# Patient Record
Sex: FEMALE | Race: WHITE | NOT HISPANIC OR LATINO | Employment: FULL TIME | URBAN - METROPOLITAN AREA
[De-identification: names, ages, dates, MRNs, and addresses within clinical notes are randomized per-mention and may not be internally consistent; named-entity substitution may affect disease eponyms.]

---

## 2013-02-01 LAB — CRC RECOMMENDATION EXT: NORMAL

## 2019-05-22 ENCOUNTER — HISTORICAL (OUTPATIENT)
Dept: ADMINISTRATIVE | Facility: HOSPITAL | Age: 59
End: 2019-05-22

## 2019-05-22 LAB
ABS NEUT (OLG): 2.64 X10(3)/MCL (ref 2.1–9.2)
ALBUMIN SERPL-MCNC: 4 GM/DL (ref 3.4–5)
ALBUMIN/GLOB SERPL: 1.5 {RATIO}
ALP SERPL-CCNC: 70 UNIT/L (ref 38–126)
ALT SERPL-CCNC: 33 UNIT/L (ref 12–78)
AST SERPL-CCNC: 16 UNIT/L (ref 15–37)
BASOPHILS # BLD AUTO: 0.1 X10(3)/MCL (ref 0–0.2)
BASOPHILS NFR BLD AUTO: 1 %
BILIRUB SERPL-MCNC: 0.7 MG/DL (ref 0.2–1)
BILIRUBIN DIRECT+TOT PNL SERPL-MCNC: 0.2 MG/DL (ref 0–0.2)
BILIRUBIN DIRECT+TOT PNL SERPL-MCNC: 0.5 MG/DL (ref 0–0.8)
BUN SERPL-MCNC: 18 MG/DL (ref 7–18)
CALCIUM SERPL-MCNC: 8.8 MG/DL (ref 8.5–10.1)
CHLORIDE SERPL-SCNC: 106 MMOL/L (ref 98–107)
CHOLEST SERPL-MCNC: 130 MG/DL (ref 0–200)
CHOLEST/HDLC SERPL: 2.5 {RATIO} (ref 0–4)
CO2 SERPL-SCNC: 30 MMOL/L (ref 21–32)
CREAT SERPL-MCNC: 0.72 MG/DL (ref 0.55–1.02)
EOSINOPHIL # BLD AUTO: 0.3 X10(3)/MCL (ref 0–0.9)
EOSINOPHIL NFR BLD AUTO: 5 %
ERYTHROCYTE [DISTWIDTH] IN BLOOD BY AUTOMATED COUNT: 12.9 % (ref 11.5–17)
GLOBULIN SER-MCNC: 2.6 GM/DL (ref 2.4–3.5)
GLUCOSE SERPL-MCNC: 105 MG/DL (ref 74–106)
HCT VFR BLD AUTO: 43.6 % (ref 37–47)
HDLC SERPL-MCNC: 53 MG/DL (ref 35–60)
HGB BLD-MCNC: 14 GM/DL (ref 12–16)
LDLC SERPL CALC-MCNC: 66 MG/DL (ref 0–129)
LYMPHOCYTES # BLD AUTO: 1.5 X10(3)/MCL (ref 0.6–4.6)
LYMPHOCYTES NFR BLD AUTO: 30 %
MCH RBC QN AUTO: 31.5 PG (ref 27–31)
MCHC RBC AUTO-ENTMCNC: 32.1 GM/DL (ref 33–36)
MCV RBC AUTO: 98.2 FL (ref 80–94)
MONOCYTES # BLD AUTO: 0.6 X10(3)/MCL (ref 0.1–1.3)
MONOCYTES NFR BLD AUTO: 12 %
NEUTROPHILS # BLD AUTO: 2.64 X10(3)/MCL (ref 2.1–9.2)
NEUTROPHILS NFR BLD AUTO: 52 %
PLATELET # BLD AUTO: 166 X10(3)/MCL (ref 130–400)
PMV BLD AUTO: 13 FL (ref 9.4–12.4)
POTASSIUM SERPL-SCNC: 4.2 MMOL/L (ref 3.5–5.1)
PROT SERPL-MCNC: 6.6 GM/DL (ref 6.4–8.2)
RBC # BLD AUTO: 4.44 X10(6)/MCL (ref 4.2–5.4)
SODIUM SERPL-SCNC: 141 MMOL/L (ref 136–145)
TRIGL SERPL-MCNC: 54 MG/DL (ref 30–150)
TSH SERPL-ACNC: 2.1 MIU/L (ref 0.36–3.74)
VLDLC SERPL CALC-MCNC: 11 MG/DL
WBC # SPEC AUTO: 5 X10(3)/MCL (ref 4.5–11.5)

## 2019-07-08 ENCOUNTER — HISTORICAL (OUTPATIENT)
Dept: ADMINISTRATIVE | Facility: HOSPITAL | Age: 59
End: 2019-07-08

## 2019-07-29 ENCOUNTER — HISTORICAL (OUTPATIENT)
Dept: RADIOLOGY | Facility: HOSPITAL | Age: 59
End: 2019-07-29

## 2019-09-12 LAB — HUMAN PAPILLOMAVIRUS (HPV): NORMAL

## 2020-05-12 ENCOUNTER — HISTORICAL (OUTPATIENT)
Dept: LAB | Facility: HOSPITAL | Age: 60
End: 2020-05-12

## 2020-05-28 ENCOUNTER — HISTORICAL (OUTPATIENT)
Dept: ADMINISTRATIVE | Facility: HOSPITAL | Age: 60
End: 2020-05-28

## 2020-05-28 LAB
ABS NEUT (OLG): 4.36 X10(3)/MCL (ref 2.1–9.2)
ALBUMIN SERPL-MCNC: 4.1 GM/DL (ref 3.4–4.8)
ALBUMIN/GLOB SERPL: 1.6 RATIO (ref 1.1–2)
ALP SERPL-CCNC: 75 UNIT/L (ref 40–150)
ALT SERPL-CCNC: 24 UNIT/L (ref 0–55)
AST SERPL-CCNC: 18 UNIT/L (ref 5–34)
BASOPHILS # BLD AUTO: 0.1 X10(3)/MCL (ref 0–0.2)
BASOPHILS NFR BLD AUTO: 1 %
BILIRUB SERPL-MCNC: 0.6 MG/DL
BILIRUBIN DIRECT+TOT PNL SERPL-MCNC: 0.2 MG/DL (ref 0–0.5)
BILIRUBIN DIRECT+TOT PNL SERPL-MCNC: 0.4 MG/DL (ref 0–0.8)
BUN SERPL-MCNC: 19.3 MG/DL (ref 9.8–20.1)
CALCIUM SERPL-MCNC: 8.9 MG/DL (ref 8.4–10.2)
CHLORIDE SERPL-SCNC: 104 MMOL/L (ref 98–107)
CHOLEST SERPL-MCNC: 155 MG/DL
CHOLEST/HDLC SERPL: 3 {RATIO} (ref 0–5)
CO2 SERPL-SCNC: 27 MMOL/L (ref 23–31)
CREAT SERPL-MCNC: 0.72 MG/DL (ref 0.55–1.02)
EOSINOPHIL # BLD AUTO: 0.2 X10(3)/MCL (ref 0–0.9)
EOSINOPHIL NFR BLD AUTO: 4 %
ERYTHROCYTE [DISTWIDTH] IN BLOOD BY AUTOMATED COUNT: 12.9 % (ref 11.5–17)
GLOBULIN SER-MCNC: 2.5 GM/DL (ref 2.4–3.5)
GLUCOSE SERPL-MCNC: 112 MG/DL (ref 82–115)
HCT VFR BLD AUTO: 44.7 % (ref 37–47)
HDLC SERPL-MCNC: 47 MG/DL (ref 35–60)
HGB BLD-MCNC: 14.2 GM/DL (ref 12–16)
LDLC SERPL CALC-MCNC: 98 MG/DL (ref 50–140)
LYMPHOCYTES # BLD AUTO: 1.2 X10(3)/MCL (ref 0.6–4.6)
LYMPHOCYTES NFR BLD AUTO: 19 %
MCH RBC QN AUTO: 31.8 PG (ref 27–31)
MCHC RBC AUTO-ENTMCNC: 31.8 GM/DL (ref 33–36)
MCV RBC AUTO: 100 FL (ref 80–94)
MONOCYTES # BLD AUTO: 0.7 X10(3)/MCL (ref 0.1–1.3)
MONOCYTES NFR BLD AUTO: 10 %
NEUTROPHILS # BLD AUTO: 4.36 X10(3)/MCL (ref 2.1–9.2)
NEUTROPHILS NFR BLD AUTO: 66 %
PLATELET # BLD AUTO: 171 X10(3)/MCL (ref 130–400)
PMV BLD AUTO: 12.7 FL (ref 9.4–12.4)
POTASSIUM SERPL-SCNC: 4.7 MMOL/L (ref 3.5–5.1)
PROT SERPL-MCNC: 6.6 GM/DL (ref 5.8–7.6)
RBC # BLD AUTO: 4.47 X10(6)/MCL (ref 4.2–5.4)
SODIUM SERPL-SCNC: 139 MMOL/L (ref 136–145)
TRIGL SERPL-MCNC: 50 MG/DL (ref 37–140)
TSH SERPL-ACNC: 1.22 UIU/ML (ref 0.35–4.94)
VLDLC SERPL CALC-MCNC: 10 MG/DL
WBC # SPEC AUTO: 6.6 X10(3)/MCL (ref 4.5–11.5)

## 2020-06-02 ENCOUNTER — HISTORICAL (OUTPATIENT)
Dept: ADMINISTRATIVE | Facility: HOSPITAL | Age: 60
End: 2020-06-02

## 2020-06-02 LAB — VIT B12 SERPL-MCNC: 288 PG/ML (ref 213–816)

## 2020-09-03 ENCOUNTER — HISTORICAL (OUTPATIENT)
Dept: RADIOLOGY | Facility: HOSPITAL | Age: 60
End: 2020-09-03

## 2020-09-24 ENCOUNTER — HISTORICAL (OUTPATIENT)
Dept: RADIOLOGY | Facility: HOSPITAL | Age: 60
End: 2020-09-24

## 2021-06-01 ENCOUNTER — HISTORICAL (OUTPATIENT)
Dept: ADMINISTRATIVE | Facility: HOSPITAL | Age: 61
End: 2021-06-01

## 2021-06-01 LAB
ABS NEUT (OLG): 2.52 X10(3)/MCL (ref 2.1–9.2)
ALBUMIN SERPL-MCNC: 3.9 GM/DL (ref 3.4–4.8)
ALBUMIN/GLOB SERPL: 1.6 RATIO (ref 1.1–2)
ALP SERPL-CCNC: 73 UNIT/L (ref 40–150)
ALT SERPL-CCNC: 19 UNIT/L (ref 0–55)
AST SERPL-CCNC: 16 UNIT/L (ref 5–34)
BASOPHILS # BLD AUTO: 0.1 X10(3)/MCL (ref 0–0.2)
BASOPHILS NFR BLD AUTO: 1 %
BILIRUB SERPL-MCNC: 0.6 MG/DL
BILIRUBIN DIRECT+TOT PNL SERPL-MCNC: 0.3 MG/DL (ref 0–0.5)
BILIRUBIN DIRECT+TOT PNL SERPL-MCNC: 0.3 MG/DL (ref 0–0.8)
BUN SERPL-MCNC: 14.2 MG/DL (ref 9.8–20.1)
CALCIUM SERPL-MCNC: 9.1 MG/DL (ref 8.4–10.2)
CHLORIDE SERPL-SCNC: 107 MMOL/L (ref 98–107)
CHOLEST SERPL-MCNC: 138 MG/DL
CHOLEST/HDLC SERPL: 3 {RATIO} (ref 0–5)
CO2 SERPL-SCNC: 26 MMOL/L (ref 23–31)
CREAT SERPL-MCNC: 0.76 MG/DL (ref 0.55–1.02)
EOSINOPHIL # BLD AUTO: 0.2 X10(3)/MCL (ref 0–0.9)
EOSINOPHIL NFR BLD AUTO: 5 %
ERYTHROCYTE [DISTWIDTH] IN BLOOD BY AUTOMATED COUNT: 13 % (ref 11.5–17)
GLOBULIN SER-MCNC: 2.4 GM/DL (ref 2.4–3.5)
GLUCOSE SERPL-MCNC: 106 MG/DL (ref 82–115)
HCT VFR BLD AUTO: 46.1 % (ref 37–47)
HDLC SERPL-MCNC: 46 MG/DL (ref 35–60)
HGB BLD-MCNC: 14.7 GM/DL (ref 12–16)
LDLC SERPL CALC-MCNC: 78 MG/DL (ref 50–140)
LYMPHOCYTES # BLD AUTO: 1.4 X10(3)/MCL (ref 0.6–4.6)
LYMPHOCYTES NFR BLD AUTO: 30 %
MCH RBC QN AUTO: 31.6 PG (ref 27–31)
MCHC RBC AUTO-ENTMCNC: 31.9 GM/DL (ref 33–36)
MCV RBC AUTO: 99.1 FL (ref 80–94)
MONOCYTES # BLD AUTO: 0.5 X10(3)/MCL (ref 0.1–1.3)
MONOCYTES NFR BLD AUTO: 11 %
NEUTROPHILS # BLD AUTO: 2.52 X10(3)/MCL (ref 2.1–9.2)
NEUTROPHILS NFR BLD AUTO: 53 %
PLATELET # BLD AUTO: 168 X10(3)/MCL (ref 130–400)
PMV BLD AUTO: 13.4 FL (ref 9.4–12.4)
POTASSIUM SERPL-SCNC: 4.4 MMOL/L (ref 3.5–5.1)
PROT SERPL-MCNC: 6.3 GM/DL (ref 5.8–7.6)
RBC # BLD AUTO: 4.65 X10(6)/MCL (ref 4.2–5.4)
SODIUM SERPL-SCNC: 141 MMOL/L (ref 136–145)
TRIGL SERPL-MCNC: 69 MG/DL (ref 37–140)
TSH SERPL-ACNC: 1.65 UIU/ML (ref 0.35–4.94)
VLDLC SERPL CALC-MCNC: 14 MG/DL
WBC # SPEC AUTO: 4.7 X10(3)/MCL (ref 4.5–11.5)

## 2021-07-20 ENCOUNTER — HISTORICAL (OUTPATIENT)
Dept: RADIOLOGY | Facility: HOSPITAL | Age: 61
End: 2021-07-20

## 2021-08-02 ENCOUNTER — HISTORICAL (OUTPATIENT)
Dept: RADIOLOGY | Facility: HOSPITAL | Age: 61
End: 2021-08-02

## 2021-09-08 ENCOUNTER — HISTORICAL (OUTPATIENT)
Dept: RADIOLOGY | Facility: HOSPITAL | Age: 61
End: 2021-09-08

## 2021-09-08 LAB — BCS RECOMMENDATION EXT: NORMAL

## 2022-04-11 ENCOUNTER — HISTORICAL (OUTPATIENT)
Dept: ADMINISTRATIVE | Facility: HOSPITAL | Age: 62
End: 2022-04-11
Payer: COMMERCIAL

## 2022-04-25 VITALS
WEIGHT: 253 LBS | SYSTOLIC BLOOD PRESSURE: 130 MMHG | BODY MASS INDEX: 49.67 KG/M2 | DIASTOLIC BLOOD PRESSURE: 76 MMHG | HEIGHT: 60 IN | OXYGEN SATURATION: 95 %

## 2022-05-05 NOTE — HISTORICAL [facility]
This is a historical note converted from Zi. Formatting and pictures may have been removed.  Please reference [employer] for original formatting and attached multimedia. Chief Complaint  bilateral knee pain. ?had scopes done 5 years ago. ?The pain depends on her activity. ?She takes Celebrex daily that helps.  History of Present Illness  Right and left_ KNEE  ?  Knee joint pain, Worse with weightbearing  Slowly worsens with extended activity  Pain is increased by bending it and by twisting  complains of knee joint swelling and stiffness  Catching and grating during movement  Patient had good pain relief with Synvisc 1 injections?by Dr. Leyva in the past.? She currently wants injection in the right knee?with Synvisc. ?Her last injections were about 2 years ago per the patient.  Review of Systems  Systemic: No fever, no chills, and no recent weight change.  Head: No headache - frequent.  Eyes: No vision problems.  Otolarnygeal: No hearing loss, no earache, no epistaxis, no hoarseness, and no tooth pain. Gums normal.  Cardiovascular: No chest pain or discomfort and no palpitations.  Pulmonary: No pulmonary symptoms - difficulty sleeping, no dyspnea, and cough not worse in the morning.  Gastrointestinal: Appetite not decreased. No dysphagia and no constant eructation. No nausea, no vomiting, no abdominal pain, no hematochezia, and no loose/mushy stools - frequent. No constipation - frequent.  Genitourinary: No genitourinary symptoms - Getting up every night to urinate and no increase in urinary frequency. No urinary hesitancy. No urinary loss of control - difficulty stopping urination and no burning sensation during urination.  Musculoskeletal: No calf muscle cramps and no localized soft tissue swelling of the ankle.  Neurological: No fainting and no convulsions.  Psychological: Not feeling nervous tension, not feeling nervous from exhaustion, and no depression.  Skin: No rash. Previous history of no ulcers.  Physical  Exam  Vitals & Measurements  T:?98.3? ?F (Oral)?  HT:?152?cm? WT:?113.39?kg? BMI:?49.08?  Right knee exam:  PHYSICAL FINDINGS  Cardiovascular:  Arterial Pulses: Posterior tibialis pulses were normal right. Dorsalis pedis pulses were normal right.  Musculoskeletal System:  Thigh:  Right Thigh: Thigh showed quadriceps atrophy.  Knee:  Right Knee: Examined.  Knee:  Grade in the knee: Value  Grade effusion 1  Genu varum. Patella demonstrated crepitus. Anteromedial aspect was tender on palpation. Medial aspect was tender on palpation. Medial collateral ligament was tender on palpation. Active motion.  Right Knee:  Right Knee Motion: Value  Active flexion 120_ degrees  Active extension 0_ degrees  Pain was elicited by flexion. No erythema. No warmth. No medial instability. No lateral instability. No one plane medial (straight) instability. No one plane lateral (straight) instability. A Lachman test did not demonstrate one plane anterior instability.  Neurological:  Gait And Stance: A right-sided antalgic gait was observed.  ?  ?  TESTS  Imaging:  X-Ray Knee:  A complete knee x-ray with standing views was performed -of right knee.  AP and lateral view x-rays of the right knee with sunrise view of the patella were performed -of right knee.  ?  ?  IMPRESSIONS RADIOLOGY TEST  Cartilage space narrowing medial compartment right knee joint with tricompartmental osteophytes  ?   Left knee exam:  PHYSICAL FINDINGS  Cardiovascular:  Arterial Pulses: Posterior tibialis pulses were normal left. Dorsalis pedis pulses were normal left.  Musculoskeletal System:  Thigh:  Left Thigh: Thigh showed quadriceps atrophy.  Knee:  Left Knee: Examined.  Knee:  Grade in the knee: Value  Grade effusion 1  Genu varum. Patella demonstrated crepitus. Anteromedial aspect was tender on palpation. Medial aspect was tender on palpation. Medial collateral ligament was tender on palpation. Active motion.  Left Knee:  Left Knee Motion: Value  Active flexion  120_ degrees  Active extension _0 degrees  Pain was elicited by flexion. No erythema. No warmth. No medial instability. No lateral instability. No one plane medial (straight) instability. No one plane lateral (straight) instability. A Lachman test did not demonstrate one plane anterior instability.  Neurological:  Gait And Stance: A left-sided antalgic gait was observed.  ?  ?   TESTS  Imaging:  X-Ray Knee:  A complete knee x-ray with standing views was performed -of left knee.  AP and lateral view x-rays of the left knee with sunrise view of the patella were performed -of left knee.  ?  ?   IMPRESSIONS RADIOLOGY TEST  Cartilage space narrowing medial compartment left knee joint with tricompartmental osteophytes.  Assessment/Plan  1.?Morbid Obesity(  Probable Diagnosis  )?E66.01  ?Weight loss to achieve a BMI less than 40. ?Refer to the [facility] weight loss program.  Ordered:  Office/Outpatient Visit Level 2 New 20215 PC, Morbid Obesity(  Probable Diagnosis  )  Degenerative arthritis of knee, bilateral, [facility], 07/08/19 9:08:00 CDT  ?  2.?Degenerative arthritis of knee, bilateral?M17.0  ?Synvisc 1 injection?right knee once?certified with insurance.  Ordered:  Office/Outpatient Visit Level 2 New [id number] PC, Morbid Obesity(  Probable Diagnosis  )  [facility], [facility] 9[facility]08[facility]00 [facility]  ?  Orders:  Clinic Follow-up PRN, 07/08/19 9:08:00 CDT, Future Order, [facility]  XR Knee Left 4 or More Views, Routine, 07/08/19 8:39:00 CDT, Pain, None, Ambulatory, Rad Type, Bilateral knee pain, Not Scheduled, 07/08/19 8:39:00 CDT  XR Knee Right 4 or More Views, Routine, 07/08/19 8:39:00 CDT, Pain, None, Ambulatory, Rad Type, Bilateral knee pain, Not Scheduled, 07/08/19 8:39:00 CDT  Referrals  Clinic Follow-up PRN, 07/08/19 9:08:00 CDT, Future Order, [facility]   Problem List/Past Medical History  Ongoing  Abnormal CXR (chest x-ray)(  Confirmed   )  Asthma  Degenerative arthritis of knee, bilateral  Hyperlipidemia  Hyperlipidemia  Morbid Obesity(  Probable Diagnosis  )  Ongoing use of possibly toxic medication  Well adult  Well adult  Well adult health check  Historical  No qualifying data  Procedure/Surgical History  Mammogram - screening (07/24/2018)  Bone density scan (06/23/2017)  Mammogram - screening (06/23/2017)  Arthroscopic repair medial meniscus (05/25/2016)  Mammogram - screening (05/11/2016)  Papanicolaou smear taken (04/19/2016)  Arthroscopic knee operation (01/07/2015)  abcess on shin  tonsillectomy   Medications  alPRAzolam 0.25 mg oral tab  benazepril 10 mg oral tablet, See Instructions, 3 refills  CeleBREX 200 mg oral capsule, 200 mg= 1 cap(s), Oral, Daily  doxycycline hyclate 100 mg oral capsule, 100 mg= 1 cap(s), Oral, BID,? ?Not Taking per Prescriber: Last Dose Date/Time Unknown  escitalopram 20 mg oral tablet  pravastatin 40 mg oral tablet, 40 mg= 1 tab(s), Oral, Daily  prednisONE 5 mg oral tablet, 5 mg= 1 tab(s), Oral, BID,? ?Not Taking, Completed Rx: Last Dose Date/Time Unknown  Allergies  Bactrim?(Abdominal pain)  erythromycin?(Abdominal pain)  penicillins?(Flushing, Hives)  Tessalon Perles?(Numbness around mouth 07-JUN-2017 17:48:24<$>)  clindamycin?(Abdominal pain, Nausea)  iodine  Social History  Abuse/Neglect  No, 07/08/2019  Alcohol  Current, 09/04/2015  Employment/School  Employed, 09/04/2015  Exercise  Exercise duration: 0., 05/01/2017  Home/Environment  Lives with Spouse., 09/04/2015  Nutrition/Health  Regular, 09/04/2015  Sexual  Sexually active: Yes., 05/01/2017  Substance Use  Never, 09/04/2015  Tobacco  Never (less than 100 in lifetime), N/A, 07/08/2019  Family History  Bypass: Father.  Diabetes mellitus type 1.: Mother.  Heart attack.: Mother and Father.  Hyperlipidemia.: Brother.  Hypertension.: Brother.  Pacemaker: Mother.  Stent: Father.  Stroke: Mother.  Immunizations  Vaccine Date Status   influenza virus vaccine,  inactivated 11/14/2015 Recorded   tetanus-diphtheria toxoids 09/30/2015 Recorded   influenza virus vaccine, inactivated 09/30/2014 Recorded   Health Maintenance  Health Maintenance  ???Pending?(in the next year)  ??? ??OverDue  ??? ? ? ?Diabetes Screening due??and every?  ??? ??Due?  ??? ? ? ?Asthma Management-Spirometry due??07/08/19??Variable frequency  ??? ? ? ?Asthma Management-Lni Peak Flow due??07/08/19??Variable frequency  ??? ? ? ?Asthma Management-Written Action Plan due??07/08/19??and every 6??month(s)  ??? ? ? ?TB Skin Test due??07/08/19??Variable frequency  ??? ? ? ?Zoster Vaccine due??07/08/19??and every 100??year(s)  ??? ??Due In Future?  ??? ? ? ?Asthma Management-Asthma Education not due until??11/28/19??and every 6??month(s)  ??? ? ? ?Alcohol Misuse Screening not due until??01/01/20??and every 1??year(s)  ??? ? ? ?Obesity Screening not due until??01/01/20??and every 1??year(s)  ??? ? ? ?Depression Screening not due until??05/27/20??and every 1??year(s)  ??? ? ? ?Aspirin Therapy for CVD Prevention not due until??05/28/20??and every 1??year(s)  ??? ? ? ?Blood Pressure Screening not due until??07/01/20??and every 1??year(s)  ??? ? ? ?ADL Screening not due until??07/02/20??and every 1??year(s)  ??? ? ? ?Body Mass Index Check not due until??07/07/20??and every 1??year(s)  ???Satisfied?(in the past 1 year)  ??? ??Satisfied?  ??? ? ? ?ADL Screening on??07/02/19.??Satisfied by Hung Araya LPN  ??? ? ? ?Alcohol Misuse Screening on??05/28/19.??Satisfied by Gretel Carrillo LPN  ??? ? ? ?Aspirin Therapy for CVD Prevention on??05/28/19.??Satisfied by Gretel Carrillo LPN  ??? ? ? ?Asthma Management-Asthma Education on??05/28/19.??Satisfied by Gretel Carrillo LPN  ??? ? ? ?Blood Pressure Screening on??07/02/19.??Satisfied by Hung Araya LPN.  ??? ? ? ?Body Mass Index Check on??07/08/19.??Satisfied by Flora Alan  ??? ? ? ?Breast Cancer Screening on??07/24/18.??Satisfied by  Gretel Carrillo LPN  ??? ? ? ?Depression Screening on??05/28/19.??Satisfied by Gretel Carrillo LPN  ??? ? ? ?Diabetes Screening on??05/22/19.??Satisfied by Shantel Renee  ??? ? ? ?Lipid Screening on??05/22/19.??Satisfied by Shantel Renee  ??? ? ? ?Obesity Screening on??07/08/19.??Satisfied by Flora Alan  ?

## 2022-06-07 ENCOUNTER — LAB VISIT (OUTPATIENT)
Dept: LAB | Facility: HOSPITAL | Age: 62
End: 2022-06-07
Attending: INTERNAL MEDICINE
Payer: COMMERCIAL

## 2022-06-07 DIAGNOSIS — E78.5 HYPERLIPIDEMIA, UNSPECIFIED HYPERLIPIDEMIA TYPE: ICD-10-CM

## 2022-06-07 DIAGNOSIS — I10 ESSENTIAL HYPERTENSION, MALIGNANT: ICD-10-CM

## 2022-06-07 DIAGNOSIS — Z78.9 NORMAL TISSUE: ICD-10-CM

## 2022-06-07 DIAGNOSIS — M17.0 PRIMARY OSTEOARTHRITIS OF BOTH KNEES: ICD-10-CM

## 2022-06-07 DIAGNOSIS — J45.909 ASTHMATIC BRONCHITIS: Primary | ICD-10-CM

## 2022-06-07 LAB
ALBUMIN SERPL-MCNC: 3.9 GM/DL (ref 3.4–4.8)
ALBUMIN/GLOB SERPL: 1.4 RATIO (ref 1.1–2)
ALP SERPL-CCNC: 67 UNIT/L (ref 40–150)
ALT SERPL-CCNC: 21 UNIT/L (ref 0–55)
AST SERPL-CCNC: 19 UNIT/L (ref 5–34)
BASOPHILS # BLD AUTO: 0.06 X10(3)/MCL (ref 0–0.2)
BASOPHILS NFR BLD AUTO: 1.2 %
BILIRUBIN DIRECT+TOT PNL SERPL-MCNC: 0.7 MG/DL
BUN SERPL-MCNC: 17.5 MG/DL (ref 9.8–20.1)
CALCIUM SERPL-MCNC: 9.1 MG/DL (ref 8.4–10.2)
CHLORIDE SERPL-SCNC: 105 MMOL/L (ref 98–107)
CHOLEST SERPL-MCNC: 155 MG/DL
CHOLEST/HDLC SERPL: 3 {RATIO} (ref 0–5)
CO2 SERPL-SCNC: 28 MMOL/L (ref 23–31)
CREAT SERPL-MCNC: 0.78 MG/DL (ref 0.55–1.02)
EOSINOPHIL # BLD AUTO: 0.21 X10(3)/MCL (ref 0–0.9)
EOSINOPHIL NFR BLD AUTO: 4 %
ERYTHROCYTE [DISTWIDTH] IN BLOOD BY AUTOMATED COUNT: 12.8 % (ref 11.5–17)
GLOBULIN SER-MCNC: 2.7 GM/DL (ref 2.4–3.5)
GLUCOSE SERPL-MCNC: 95 MG/DL (ref 82–115)
HCT VFR BLD AUTO: 46.4 % (ref 37–47)
HDLC SERPL-MCNC: 49 MG/DL (ref 35–60)
HGB BLD-MCNC: 15.2 GM/DL (ref 12–16)
IMM GRANULOCYTES # BLD AUTO: 0.01 X10(3)/MCL (ref 0–0.02)
IMM GRANULOCYTES NFR BLD AUTO: 0.2 % (ref 0–0.43)
LDLC SERPL CALC-MCNC: 94 MG/DL (ref 50–140)
LYMPHOCYTES # BLD AUTO: 1.4 X10(3)/MCL (ref 0.6–4.6)
LYMPHOCYTES NFR BLD AUTO: 26.9 %
MCH RBC QN AUTO: 31.7 PG (ref 27–31)
MCHC RBC AUTO-ENTMCNC: 32.8 MG/DL (ref 33–36)
MCV RBC AUTO: 96.9 FL (ref 80–94)
MONOCYTES # BLD AUTO: 0.47 X10(3)/MCL (ref 0.1–1.3)
MONOCYTES NFR BLD AUTO: 9 %
NEUTROPHILS # BLD AUTO: 3.1 X10(3)/MCL (ref 2.1–9.2)
NEUTROPHILS NFR BLD AUTO: 58.7 %
NRBC BLD AUTO-RTO: 0 %
PLATELET # BLD AUTO: 185 X10(3)/MCL (ref 130–400)
PMV BLD AUTO: 12.2 FL (ref 9.4–12.4)
POTASSIUM SERPL-SCNC: 4.6 MMOL/L (ref 3.5–5.1)
PROT SERPL-MCNC: 6.6 GM/DL (ref 5.8–7.6)
RBC # BLD AUTO: 4.79 X10(6)/MCL (ref 4.2–5.4)
SODIUM SERPL-SCNC: 140 MMOL/L (ref 136–145)
TRIGL SERPL-MCNC: 62 MG/DL (ref 37–140)
TSH SERPL-ACNC: 1.4 UIU/ML (ref 0.35–4.94)
VLDLC SERPL CALC-MCNC: 12 MG/DL
WBC # SPEC AUTO: 5.2 X10(3)/MCL (ref 4.5–11.5)

## 2022-06-07 PROCEDURE — 85025 COMPLETE CBC W/AUTO DIFF WBC: CPT

## 2022-06-07 PROCEDURE — 84443 ASSAY THYROID STIM HORMONE: CPT

## 2022-06-07 PROCEDURE — 36415 COLL VENOUS BLD VENIPUNCTURE: CPT

## 2022-06-07 PROCEDURE — 80061 LIPID PANEL: CPT

## 2022-06-07 PROCEDURE — 80053 COMPREHEN METABOLIC PANEL: CPT

## 2022-06-09 ENCOUNTER — OFFICE VISIT (OUTPATIENT)
Dept: INTERNAL MEDICINE | Facility: CLINIC | Age: 62
End: 2022-06-09
Payer: COMMERCIAL

## 2022-06-09 VITALS
SYSTOLIC BLOOD PRESSURE: 126 MMHG | BODY MASS INDEX: 52.21 KG/M2 | DIASTOLIC BLOOD PRESSURE: 82 MMHG | RESPIRATION RATE: 18 BRPM | HEART RATE: 63 BPM | TEMPERATURE: 97 F | OXYGEN SATURATION: 97 % | WEIGHT: 259 LBS | HEIGHT: 59 IN

## 2022-06-09 DIAGNOSIS — E66.01 MORBID OBESITY: ICD-10-CM

## 2022-06-09 DIAGNOSIS — J45.909 ASTHMA, UNSPECIFIED ASTHMA SEVERITY, UNSPECIFIED WHETHER COMPLICATED, UNSPECIFIED WHETHER PERSISTENT: ICD-10-CM

## 2022-06-09 DIAGNOSIS — E78.5 HYPERLIPIDEMIA, UNSPECIFIED HYPERLIPIDEMIA TYPE: ICD-10-CM

## 2022-06-09 DIAGNOSIS — I10 BENIGN ESSENTIAL HYPERTENSION: ICD-10-CM

## 2022-06-09 DIAGNOSIS — Z00.00 WELLNESS EXAMINATION: Primary | ICD-10-CM

## 2022-06-09 PROBLEM — M17.9 OSTEOARTHRITIS OF KNEE: Status: ACTIVE | Noted: 2022-06-09

## 2022-06-09 PROCEDURE — 3079F DIAST BP 80-89 MM HG: CPT | Mod: CPTII,,, | Performed by: INTERNAL MEDICINE

## 2022-06-09 PROCEDURE — 1159F MED LIST DOCD IN RCRD: CPT | Mod: CPTII,,, | Performed by: INTERNAL MEDICINE

## 2022-06-09 PROCEDURE — 3008F PR BODY MASS INDEX (BMI) DOCUMENTED: ICD-10-PCS | Mod: CPTII,,, | Performed by: INTERNAL MEDICINE

## 2022-06-09 PROCEDURE — 99386 PREV VISIT NEW AGE 40-64: CPT | Mod: ,,, | Performed by: INTERNAL MEDICINE

## 2022-06-09 PROCEDURE — 99386 PR PREVENTIVE VISIT,NEW,40-64: ICD-10-PCS | Mod: ,,, | Performed by: INTERNAL MEDICINE

## 2022-06-09 PROCEDURE — 3074F PR MOST RECENT SYSTOLIC BLOOD PRESSURE < 130 MM HG: ICD-10-PCS | Mod: CPTII,,, | Performed by: INTERNAL MEDICINE

## 2022-06-09 PROCEDURE — 3079F PR MOST RECENT DIASTOLIC BLOOD PRESSURE 80-89 MM HG: ICD-10-PCS | Mod: CPTII,,, | Performed by: INTERNAL MEDICINE

## 2022-06-09 PROCEDURE — 3008F BODY MASS INDEX DOCD: CPT | Mod: CPTII,,, | Performed by: INTERNAL MEDICINE

## 2022-06-09 PROCEDURE — 3074F SYST BP LT 130 MM HG: CPT | Mod: CPTII,,, | Performed by: INTERNAL MEDICINE

## 2022-06-09 PROCEDURE — 1159F PR MEDICATION LIST DOCUMENTED IN MEDICAL RECORD: ICD-10-PCS | Mod: CPTII,,, | Performed by: INTERNAL MEDICINE

## 2022-06-09 RX ORDER — PRAVASTATIN SODIUM 40 MG/1
40 TABLET ORAL NIGHTLY
COMMUNITY
Start: 2022-05-16 | End: 2022-06-09 | Stop reason: SDUPTHER

## 2022-06-09 RX ORDER — ALPRAZOLAM 0.25 MG/1
0.25 TABLET ORAL 3 TIMES DAILY
COMMUNITY
Start: 2022-04-12

## 2022-06-09 RX ORDER — METOPROLOL SUCCINATE 50 MG/1
50 TABLET, EXTENDED RELEASE ORAL DAILY
Qty: 90 TABLET | Refills: 3 | Status: SHIPPED | OUTPATIENT
Start: 2022-06-09 | End: 2023-03-14

## 2022-06-09 RX ORDER — CELECOXIB 200 MG/1
200 CAPSULE ORAL DAILY
COMMUNITY
Start: 2022-04-12 | End: 2022-06-09 | Stop reason: SDUPTHER

## 2022-06-09 RX ORDER — CELECOXIB 200 MG/1
200 CAPSULE ORAL DAILY
Qty: 90 CAPSULE | Refills: 3 | Status: SHIPPED | OUTPATIENT
Start: 2022-06-09 | End: 2023-06-09 | Stop reason: SDUPTHER

## 2022-06-09 RX ORDER — METOPROLOL SUCCINATE 50 MG/1
50 TABLET, EXTENDED RELEASE ORAL DAILY
COMMUNITY
Start: 2022-05-16 | End: 2022-06-09 | Stop reason: SDUPTHER

## 2022-06-09 RX ORDER — PRAVASTATIN SODIUM 40 MG/1
40 TABLET ORAL NIGHTLY
Qty: 90 TABLET | Refills: 3 | Status: SHIPPED | OUTPATIENT
Start: 2022-06-09 | End: 2023-04-04

## 2022-06-09 RX ORDER — ESCITALOPRAM OXALATE 20 MG/1
20 TABLET ORAL DAILY
COMMUNITY
Start: 2022-05-16

## 2022-06-09 NOTE — PROGRESS NOTES
"Subjective:       Patient ID: Fady Peralta is a 62 y.o. female.    Chief Complaint: Annual Exam      Is a 62-year-old woman in for wellness check.  She feels okay except for stress related to work.  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  No specific complaints.  Loss of arthritic symptoms.  She does have anxiety followed by Dr. Malik LIMON.    Review of Systems     Current Outpatient Medications on File Prior to Visit   Medication Sig Dispense Refill    ALPRAZolam (XANAX) 0.25 MG tablet Take 0.25 mg by mouth 3 (three) times daily.      EScitalopram oxalate (LEXAPRO) 20 MG tablet Take 20 mg by mouth once daily.      [DISCONTINUED] celecoxib (CELEBREX) 200 MG capsule Take 200 mg by mouth once daily.      [DISCONTINUED] metoprolol succinate (TOPROL-XL) 50 MG 24 hr tablet Take 50 mg by mouth once daily.      [DISCONTINUED] pravastatin (PRAVACHOL) 40 MG tablet Take 40 mg by mouth nightly.       No current facility-administered medications on file prior to visit.     Objective:      /82 (BP Location: Right arm, Patient Position: Sitting, BP Method: Large (Manual))   Pulse 63   Temp 97.3 °F (36.3 °C) (Temporal)   Resp 18   Ht 4' 11" (1.499 m)   Wt 117.5 kg (259 lb)   SpO2 97%   BMI 52.31 kg/m²     Physical Exam  Vitals reviewed.   Constitutional:       Appearance: Normal appearance.   HENT:      Head: Normocephalic.      Nose: Nose normal.      Mouth/Throat:      Pharynx: Oropharynx is clear.   Eyes:      Pupils: Pupils are equal, round, and reactive to light.   Neck:      Thyroid: No thyromegaly.   Cardiovascular:      Rate and Rhythm: Normal rate and regular rhythm.      Pulses: Normal pulses.   Abdominal:      General: Abdomen is flat. Bowel sounds are normal.      Palpations: Abdomen is soft. There is no hepatomegaly, splenomegaly or mass.      Tenderness: There is no abdominal tenderness.   Musculoskeletal:      Cervical back: Neck supple. "   Lymphadenopathy:      Cervical: No cervical adenopathy.   Skin:     General: Skin is warm and dry.   Neurological:      Mental Status: She is alert.       laboratory studies reviewed and numbers acceptable.  Assessment:       1. Wellness examination    2. Benign essential hypertension    3. Hyperlipidemia, unspecified hyperlipidemia type    4. Asthma, unspecified asthma severity, unspecified whether complicated, unspecified whether persistent    5. Morbid obesity      overall medical problems control.  Weight is up a few more lb.  Plan:       Continue same meds.  Follow-up yearly with CBC CMP lipid prior.  Mammogram will be due in September an order written.  Encourage weight reduction.

## 2022-07-06 ENCOUNTER — CLINICAL SUPPORT (OUTPATIENT)
Dept: URGENT CARE | Facility: CLINIC | Age: 62
End: 2022-07-06
Payer: COMMERCIAL

## 2022-07-06 VITALS
HEART RATE: 57 BPM | WEIGHT: 259 LBS | SYSTOLIC BLOOD PRESSURE: 126 MMHG | HEIGHT: 59 IN | BODY MASS INDEX: 52.21 KG/M2 | TEMPERATURE: 99 F | OXYGEN SATURATION: 98 % | DIASTOLIC BLOOD PRESSURE: 77 MMHG

## 2022-07-06 DIAGNOSIS — Z20.822 EXPOSURE TO COVID-19 VIRUS: Primary | ICD-10-CM

## 2022-07-06 DIAGNOSIS — J02.9 SORE THROAT: Primary | ICD-10-CM

## 2022-07-06 DIAGNOSIS — J02.9 SORE THROAT: ICD-10-CM

## 2022-07-06 DIAGNOSIS — U07.1 COVID-19 VIRUS DETECTED: ICD-10-CM

## 2022-07-06 DIAGNOSIS — U07.1 COVID-19: Primary | ICD-10-CM

## 2022-07-06 LAB
CTP QC/QA: YES
SARS-COV-2 RDRP RESP QL NAA+PROBE: POSITIVE

## 2022-07-06 PROCEDURE — 99213 PR OFFICE/OUTPT VISIT, EST, LEVL III, 20-29 MIN: ICD-10-PCS | Mod: ,,, | Performed by: FAMILY MEDICINE

## 2022-07-06 PROCEDURE — U0002 COVID-19 LAB TEST NON-CDC: HCPCS | Mod: QW,,, | Performed by: FAMILY MEDICINE

## 2022-07-06 PROCEDURE — U0002: ICD-10-PCS | Mod: QW,,, | Performed by: FAMILY MEDICINE

## 2022-07-06 PROCEDURE — 99213 OFFICE O/P EST LOW 20 MIN: CPT | Mod: ,,, | Performed by: FAMILY MEDICINE

## 2022-07-06 NOTE — PROGRESS NOTES
"Subjective:       Patient ID: Fady Peralta is a 62 y.o. female.    Vitals:  height is 4' 11" (1.499 m) and weight is 117.5 kg (259 lb). Her temperature is 98.7 °F (37.1 °C). Her blood pressure is 126/77 and her pulse is 57 (abnormal). Her oxygen saturation is 98%.     Chief Complaint: covid positive    Tested positive for COVID through employee health.  About 2 days of cough and cnogestion.  No fever.       Constitution: Negative for chills, fatigue and fever.   HENT: Positive for postnasal drip. Negative for congestion, sinus pressure and trouble swallowing.    Neck: Negative for neck pain and neck stiffness.   Cardiovascular: Negative for chest pain, leg swelling and sob on exertion.   Respiratory: Positive for cough. Negative for chest tightness, shortness of breath and wheezing.    Neurological: Negative for dizziness, disorientation and altered mental status.   Psychiatric/Behavioral: Negative for altered mental status and disorientation.       Objective:      Physical Exam   Constitutional: She is oriented to person, place, and time. She appears well-developed. No distress.   HENT:   Head: Normocephalic.   Ears:   Right Ear: Tympanic membrane and external ear normal.   Left Ear: Tympanic membrane and external ear normal.   Nose: Rhinorrhea present.   Mouth/Throat: Uvula is midline and mucous membranes are normal. No uvula swelling. Cobblestoning present. No oropharyngeal exudate or posterior oropharyngeal edema. Tonsils are 0 on the right. Tonsils are 0 on the left. No tonsillar exudate.   Eyes: Pupils are equal, round, and reactive to light. Right eye exhibits no discharge. Left eye exhibits no discharge.   Neck: Neck supple. No tracheal deviation present.   Cardiovascular: Normal rate, regular rhythm and normal heart sounds.   No murmur heard.  Pulmonary/Chest: Effort normal and breath sounds normal. No stridor. No respiratory distress. She has no wheezes.   Abdominal: Normal appearance. "   Lymphadenopathy:     She has no cervical adenopathy.   Neurological: She is alert and oriented to person, place, and time.   Skin: Skin is warm and dry.   Psychiatric: Mood and thought content normal.   Nursing note and vitals reviewed.        Assessment:       1. COVID-19          Plan:         COVID-19            Recommend treating symptoms and may benefit from antiviral Paxlovid.

## 2022-07-06 NOTE — PROGRESS NOTES
Patient presents to clinic for covid test.  covid test performed and results were positivie.  Patient was contacted with results.

## 2022-07-06 NOTE — PATIENT INSTRUCTIONS
Vitamin D and Zinc.  Force fluids.  Home quarantine for 5 days from symptom onset.  If symptoms have improved and 48 hours fever free can stop home quarantine on day 6 and wear a mask when around others for additional 5 days.  Contagious up to 10 days and fever free. Paxlovid as directed.   ER for severe worsening.

## 2022-08-05 ENCOUNTER — OFFICE VISIT (OUTPATIENT)
Dept: INTERNAL MEDICINE | Facility: CLINIC | Age: 62
End: 2022-08-05
Payer: COMMERCIAL

## 2022-08-05 ENCOUNTER — HOSPITAL ENCOUNTER (OUTPATIENT)
Dept: RADIOLOGY | Facility: HOSPITAL | Age: 62
Discharge: HOME | End: 2022-08-05
Attending: NURSE PRACTITIONER
Payer: COMMERCIAL

## 2022-08-05 VITALS
DIASTOLIC BLOOD PRESSURE: 70 MMHG | HEIGHT: 59 IN | HEART RATE: 59 BPM | OXYGEN SATURATION: 96 % | SYSTOLIC BLOOD PRESSURE: 116 MMHG | BODY MASS INDEX: 52.82 KG/M2 | WEIGHT: 262 LBS | RESPIRATION RATE: 16 BRPM

## 2022-08-05 DIAGNOSIS — F33.0 MAJOR DEPRESSIVE DISORDER, RECURRENT, MILD: ICD-10-CM

## 2022-08-05 DIAGNOSIS — M25.511 ACUTE PAIN OF RIGHT SHOULDER: ICD-10-CM

## 2022-08-05 DIAGNOSIS — M25.511 ACUTE PAIN OF RIGHT SHOULDER: Primary | ICD-10-CM

## 2022-08-05 PROCEDURE — 3078F DIAST BP <80 MM HG: CPT | Mod: CPTII,,, | Performed by: NURSE PRACTITIONER

## 2022-08-05 PROCEDURE — 3078F PR MOST RECENT DIASTOLIC BLOOD PRESSURE < 80 MM HG: ICD-10-PCS | Mod: CPTII,,, | Performed by: NURSE PRACTITIONER

## 2022-08-05 PROCEDURE — 1159F MED LIST DOCD IN RCRD: CPT | Mod: CPTII,,, | Performed by: NURSE PRACTITIONER

## 2022-08-05 PROCEDURE — 99214 PR OFFICE/OUTPT VISIT, EST, LEVL IV, 30-39 MIN: ICD-10-PCS | Mod: ,,, | Performed by: NURSE PRACTITIONER

## 2022-08-05 PROCEDURE — 3074F PR MOST RECENT SYSTOLIC BLOOD PRESSURE < 130 MM HG: ICD-10-PCS | Mod: CPTII,,, | Performed by: NURSE PRACTITIONER

## 2022-08-05 PROCEDURE — 3008F PR BODY MASS INDEX (BMI) DOCUMENTED: ICD-10-PCS | Mod: CPTII,,, | Performed by: NURSE PRACTITIONER

## 2022-08-05 PROCEDURE — 1160F RVW MEDS BY RX/DR IN RCRD: CPT | Mod: CPTII,,, | Performed by: NURSE PRACTITIONER

## 2022-08-05 PROCEDURE — 1160F PR REVIEW ALL MEDS BY PRESCRIBER/CLIN PHARMACIST DOCUMENTED: ICD-10-PCS | Mod: CPTII,,, | Performed by: NURSE PRACTITIONER

## 2022-08-05 PROCEDURE — 99214 OFFICE O/P EST MOD 30 MIN: CPT | Mod: ,,, | Performed by: NURSE PRACTITIONER

## 2022-08-05 PROCEDURE — 3074F SYST BP LT 130 MM HG: CPT | Mod: CPTII,,, | Performed by: NURSE PRACTITIONER

## 2022-08-05 PROCEDURE — 3008F BODY MASS INDEX DOCD: CPT | Mod: CPTII,,, | Performed by: NURSE PRACTITIONER

## 2022-08-05 PROCEDURE — 73030 X-RAY EXAM OF SHOULDER: CPT | Mod: TC,RT

## 2022-08-05 PROCEDURE — 1159F PR MEDICATION LIST DOCUMENTED IN MEDICAL RECORD: ICD-10-PCS | Mod: CPTII,,, | Performed by: NURSE PRACTITIONER

## 2022-08-05 NOTE — PROGRESS NOTES
"Subjective:      Patient ID: Fady Peralta is a 62 y.o. female.    Chief Complaint: Shoulder Pain (Pt is having right shoulder pain that does down deltoid,pt states it started last week of June w/o injury)      HPI: Patient here today for c/o R shoulder pain x 1 month.  Some radiation down into deltoid. Pain exacerbated by lying on R side and elevation of arm and reaching. Rates pain 6/10. She has attempted Celebrex with minimal relief. Also, she has attempted Tylenol. Pain not constant.    She reports depression has been slightly worse with loss of father a few months ago and daughter moving out of state. She is managed by Dr. Pagan and plans to see counselor in near future. No SI/HI.     Colorectal Screening: Will request.    Review of patient's allergies indicates:   Allergen Reactions    Iodine Rash    Benzonatate      Other reaction(s): Numbness around mouth.....    Erythromycin      Other reaction(s): Abdominal pain    Penicillins Hives     Other reaction(s): Flushing    Sulfamethoxazole-trimethoprim      Other reaction(s): Abdominal pain    Clindamycin Nausea Only     Other reaction(s): Abdominal pain       Review of Systems  Constitutional: No fever, No chills, No sweats,   Musculoskeletal: R shoulder pain  Integumentary: No rash, No ecchymosis.  Neurologic: No altered mental status, No headaches.  Psychiatrics: No anxiety, + depression, No SI/HI.  Objective:   /70 (BP Location: Left arm, Patient Position: Sitting)   Pulse (!) 59   Resp 16   Ht 4' 11" (1.499 m)   Wt 118.8 kg (262 lb)   SpO2 96%   BMI 52.92 kg/m²     Physical Exam  Constitutional:       General: She is not in acute distress.     Appearance: Normal appearance. She is not ill-appearing, toxic-appearing or diaphoretic.   HENT:      Head: Normocephalic and atraumatic.   Cardiovascular:      Rate and Rhythm: Normal rate and regular rhythm.      Heart sounds: Normal heart sounds.   Pulmonary:      Effort: Pulmonary effort is " normal.      Breath sounds: Normal breath sounds.   Musculoskeletal:      Right shoulder: Tenderness (to anterior deltoid) present. No crepitus. Decreased range of motion. Normal strength.        Arms:    Skin:     General: Skin is warm and dry.   Neurological:      General: No focal deficit present.      Mental Status: She is alert and oriented to person, place, and time. Mental status is at baseline.   Psychiatric:         Mood and Affect: Mood normal.         Behavior: Behavior normal.         Thought Content: Thought content normal.         Judgment: Judgment normal.         Assessment/Plan:   1. Acute pain of right shoulder  Eval with XR.  Rec trial of Tylenol Arthritis 2 tab up to 3x/day. Ice/heat, gentle stretching as tolerated.  Consider referral to PT and/or Ortho for further eval.  - X-ray Shoulder 2 or More Views Right; Future    2. Major depressive disorder, recurrent, mild  Per Dr. Pagan.      No follow-ups on file.

## 2022-08-08 DIAGNOSIS — M25.511 ACUTE PAIN OF RIGHT SHOULDER: Primary | ICD-10-CM

## 2022-08-29 ENCOUNTER — OFFICE VISIT (OUTPATIENT)
Dept: ORTHOPEDICS | Facility: CLINIC | Age: 62
End: 2022-08-29
Payer: COMMERCIAL

## 2022-08-29 VITALS — WEIGHT: 263 LBS | HEIGHT: 60 IN | BODY MASS INDEX: 51.63 KG/M2

## 2022-08-29 DIAGNOSIS — M25.511 ACUTE PAIN OF RIGHT SHOULDER: ICD-10-CM

## 2022-08-29 DIAGNOSIS — M75.111 NONTRAUMATIC INCOMPLETE TEAR OF RIGHT ROTATOR CUFF: Primary | ICD-10-CM

## 2022-08-29 PROCEDURE — 1160F RVW MEDS BY RX/DR IN RCRD: CPT | Mod: CPTII,,, | Performed by: PHYSICIAN ASSISTANT

## 2022-08-29 PROCEDURE — 99202 PR OFFICE/OUTPT VISIT, NEW, LEVL II, 15-29 MIN: ICD-10-PCS | Mod: 25,,, | Performed by: PHYSICIAN ASSISTANT

## 2022-08-29 PROCEDURE — 3008F PR BODY MASS INDEX (BMI) DOCUMENTED: ICD-10-PCS | Mod: CPTII,,, | Performed by: PHYSICIAN ASSISTANT

## 2022-08-29 PROCEDURE — 1159F PR MEDICATION LIST DOCUMENTED IN MEDICAL RECORD: ICD-10-PCS | Mod: CPTII,,, | Performed by: PHYSICIAN ASSISTANT

## 2022-08-29 PROCEDURE — 3008F BODY MASS INDEX DOCD: CPT | Mod: CPTII,,, | Performed by: PHYSICIAN ASSISTANT

## 2022-08-29 PROCEDURE — 99202 OFFICE O/P NEW SF 15 MIN: CPT | Mod: 25,,, | Performed by: PHYSICIAN ASSISTANT

## 2022-08-29 PROCEDURE — 20610 LARGE JOINT ASPIRATION/INJECTION: R SUBACROMIAL BURSA: ICD-10-PCS | Mod: RT,,, | Performed by: PHYSICIAN ASSISTANT

## 2022-08-29 PROCEDURE — 20610 DRAIN/INJ JOINT/BURSA W/O US: CPT | Mod: RT,,, | Performed by: PHYSICIAN ASSISTANT

## 2022-08-29 PROCEDURE — 1160F PR REVIEW ALL MEDS BY PRESCRIBER/CLIN PHARMACIST DOCUMENTED: ICD-10-PCS | Mod: CPTII,,, | Performed by: PHYSICIAN ASSISTANT

## 2022-08-29 PROCEDURE — 1159F MED LIST DOCD IN RCRD: CPT | Mod: CPTII,,, | Performed by: PHYSICIAN ASSISTANT

## 2022-08-29 RX ORDER — ROSUVASTATIN CALCIUM 20 MG/1
20 TABLET, FILM COATED ORAL DAILY
COMMUNITY
Start: 2022-08-23

## 2022-08-29 RX ORDER — BETAMETHASONE SODIUM PHOSPHATE AND BETAMETHASONE ACETATE 3; 3 MG/ML; MG/ML
12 INJECTION, SUSPENSION INTRA-ARTICULAR; INTRALESIONAL; INTRAMUSCULAR; SOFT TISSUE
Status: DISCONTINUED | OUTPATIENT
Start: 2022-08-29 | End: 2022-08-29 | Stop reason: HOSPADM

## 2022-08-29 RX ORDER — LIDOCAINE HYDROCHLORIDE 20 MG/ML
3 INJECTION, SOLUTION INFILTRATION; PERINEURAL
Status: DISCONTINUED | OUTPATIENT
Start: 2022-08-29 | End: 2022-08-29 | Stop reason: HOSPADM

## 2022-08-29 RX ADMIN — BETAMETHASONE SODIUM PHOSPHATE AND BETAMETHASONE ACETATE 12 MG: 3; 3 INJECTION, SUSPENSION INTRA-ARTICULAR; INTRALESIONAL; INTRAMUSCULAR; SOFT TISSUE at 02:08

## 2022-08-29 RX ADMIN — LIDOCAINE HYDROCHLORIDE 3 MG: 20 INJECTION, SOLUTION INFILTRATION; PERINEURAL at 02:08

## 2022-08-29 NOTE — PROCEDURES
Large Joint Aspiration/Injection: R subacromial bursa    Date/Time: 8/29/2022 2:45 PM  Performed by: CORRIE Banuelos  Authorized by: CORRIE Banuelos     Consent Done?:  Yes (Verbal)  Indications:  Pain  Site marked: the procedure site was marked    Timeout: prior to procedure the correct patient, procedure, and site was verified    Prep: patient was prepped and draped in usual sterile fashion    Approach:  Posterior  Location:  Shoulder  Site:  R subacromial bursa  Medications:  12 mg betamethasone acetate-betamethasone sodium phosphate 6 mg/mL; 3 mg LIDOcaine HCL 20 mg/ml (2%) 20 mg/mL (2 %)  Patient tolerance:  Patient tolerated the procedure well with no immediate complications

## 2022-08-29 NOTE — PROGRESS NOTES
Chief Complaint:   Chief Complaint   Patient presents with    Shoulder Pain     right shoulder, started around 06/22 thought it was a pull muscle, unable to sleep on right side, limited rom, celebrex with some relief, had xray merced       History of present illness:    62-year-old right-hand-dominant female presents office today for evaluation of right shoulder pain.  This been present for 2 months with no associated injury.  She noted pain over the anterior and lateral aspect of the right shoulder with overhead movement.  She was seen by her internist and x-rays were ordered she was put on Celebrex and sent to Orthopedics for follow-up.  The pain has improved somewhat with Celebrex.    Past Medical History:   Diagnosis Date    Anxiety     Depression     Hyperlipidemia     Hypertension        Past Surgical History:   Procedure Laterality Date    KNEE ARTHROSCOPY Bilateral     TONSILLECTOMY         Current Outpatient Medications   Medication Sig    ALPRAZolam (XANAX) 0.25 MG tablet Take 0.25 mg by mouth 3 (three) times daily.    celecoxib (CELEBREX) 200 MG capsule Take 1 capsule (200 mg total) by mouth once daily.    EScitalopram oxalate (LEXAPRO) 20 MG tablet Take 20 mg by mouth once daily.    metoprolol succinate (TOPROL-XL) 50 MG 24 hr tablet Take 1 tablet (50 mg total) by mouth once daily.    CRESTOR 20 mg tablet Take 20 mg by mouth once daily.    pravastatin (PRAVACHOL) 40 MG tablet Take 1 tablet (40 mg total) by mouth nightly. (Patient not taking: Reported on 8/29/2022)     No current facility-administered medications for this visit.       Review of patient's allergies indicates:   Allergen Reactions    Iodine Rash    Benzonatate      Other reaction(s): Numbness around mouth.....    Erythromycin      Other reaction(s): Abdominal pain    Penicillins Hives     Other reaction(s): Flushing    Sulfamethoxazole-trimethoprim      Other reaction(s): Abdominal pain    Clindamycin Nausea Only     Other reaction(s):  Abdominal pain       History reviewed. No pertinent family history.    Social History     Socioeconomic History    Marital status:    Tobacco Use    Smoking status: Never    Smokeless tobacco: Never   Substance and Sexual Activity    Alcohol use: Yes    Drug use: Never    Sexual activity: Yes         Review of Systems:    Constitution:   Denies chills, fever, and sweats.  HENT:   Denies headaches or blurry vision.  Cardiovascular:  Denies chest pain or irregular heart beat.  Respiratory:   Denies cough or shortness of breath.  Gastrointestinal:  Denies abdominal pain, nausea, or vomiting.  Musculoskeletal:   Denies muscle cramps.  Neurological:   Denies dizziness or focal weakness.  Psychiatric/Behavior: Normal mental status.  Hematology/Lymph:  Denies bleeding problem or easy bruising/bleeding.  Skin:    Denies rash or suspicious lesions.    Examination:    Vital Signs:    Vitals:    08/29/22 1444   Weight: 119.3 kg (263 lb)   Height: 5' (1.524 m)       Body mass index is 51.36 kg/m².    Constitution:   Well-developed, well nourished patient in no acute distress.  Neurological:   Alert and oriented x 3 and cooperative to examination.     Psychiatric/Behavior: Normal mental status.  Respiratory:   No shortness of breath.  Eyes:    Extraoccular muscles intact  Skin:    No scars, rash or suspicious lesions.    Physical Exam:     Right Shoulder:   No swelling, erythema or increased heat    Tender over deltoid, supraspinatus and infraspinatus    Tender over bicipital groove    negative drop arm test   Positive Neer and Nazario impingement signs    weakness with rotator cuff resistance with external rotation and shoulder abduction  Active shoulder abduction 160   Active forward elevation 160   Active internal rotation 70   Active external rotation 80     Two views of the right shoulder were reviewed showing no osteoarticular abnormalities.  There was some inferior displacement of the humeral head suggesting  effusion      Assessment:     Nontraumatic incomplete tear of right rotator cuff  -     Large Joint Aspiration/Injection: R subacromial bursa  -     Ambulatory referral/consult to Physical/Occupational Therapy; Future; Expected date: 09/05/2022    Acute pain of right shoulder  -     Ambulatory referral/consult to Orthopedics        Plan:      Discussed exam and x-ray findings with the patient today.  I do feel that she has a rotator cuff tear.  Recommend conservative treatment consisting of subacromial corticosteroid injection, physical therapy program and continue Celebrex.  Follow-up in 6 weeks for repeat evaluation        Follow up in about 6 weeks (around 10/10/2022).    DISCLAIMER: This note may have been dictated using voice recognition software and may contain grammatical errors.

## 2022-09-21 ENCOUNTER — HOSPITAL ENCOUNTER (OUTPATIENT)
Dept: RADIOLOGY | Facility: HOSPITAL | Age: 62
Discharge: HOME | End: 2022-09-21
Attending: INTERNAL MEDICINE
Payer: COMMERCIAL

## 2022-09-21 DIAGNOSIS — Z00.00 WELLNESS EXAMINATION: ICD-10-CM

## 2022-09-21 PROCEDURE — 77067 SCR MAMMO BI INCL CAD: CPT | Mod: TC

## 2022-09-21 PROCEDURE — 77067 MAMMO DIGITAL SCREENING BILAT WITH TOMO: ICD-10-PCS | Mod: 26,,, | Performed by: RADIOLOGY

## 2022-09-21 PROCEDURE — 77063 MAMMO DIGITAL SCREENING BILAT WITH TOMO: ICD-10-PCS | Mod: 26,,, | Performed by: RADIOLOGY

## 2022-09-21 PROCEDURE — 77063 BREAST TOMOSYNTHESIS BI: CPT | Mod: 26,,, | Performed by: RADIOLOGY

## 2022-09-21 PROCEDURE — 77067 SCR MAMMO BI INCL CAD: CPT | Mod: 26,,, | Performed by: RADIOLOGY

## 2022-09-26 ENCOUNTER — DOCUMENTATION ONLY (OUTPATIENT)
Dept: ADMINISTRATIVE | Facility: HOSPITAL | Age: 62
End: 2022-09-26
Payer: COMMERCIAL

## 2022-09-27 ENCOUNTER — PATIENT MESSAGE (OUTPATIENT)
Dept: ADMINISTRATIVE | Facility: HOSPITAL | Age: 62
End: 2022-09-27
Payer: COMMERCIAL

## 2022-10-01 ENCOUNTER — DOCUMENTATION ONLY (OUTPATIENT)
Dept: PRIMARY CARE CLINIC | Facility: CLINIC | Age: 62
End: 2022-10-01
Payer: COMMERCIAL

## 2022-10-01 LAB — PAP RECOMMENDATION EXT: NORMAL

## 2022-10-12 ENCOUNTER — DOCUMENTATION ONLY (OUTPATIENT)
Dept: PRIMARY CARE CLINIC | Facility: CLINIC | Age: 62
End: 2022-10-12
Payer: COMMERCIAL

## 2022-12-07 ENCOUNTER — DOCUMENTATION ONLY (OUTPATIENT)
Dept: PRIMARY CARE CLINIC | Facility: CLINIC | Age: 62
End: 2022-12-07
Payer: COMMERCIAL

## 2022-12-20 ENCOUNTER — DOCUMENTATION ONLY (OUTPATIENT)
Dept: FAMILY MEDICINE | Facility: CLINIC | Age: 62
End: 2022-12-20
Payer: COMMERCIAL

## 2023-01-31 ENCOUNTER — OFFICE VISIT (OUTPATIENT)
Dept: NEUROLOGY | Facility: CLINIC | Age: 63
End: 2023-01-31
Payer: COMMERCIAL

## 2023-01-31 VITALS
SYSTOLIC BLOOD PRESSURE: 116 MMHG | HEIGHT: 60 IN | BODY MASS INDEX: 52.03 KG/M2 | DIASTOLIC BLOOD PRESSURE: 74 MMHG | WEIGHT: 265 LBS

## 2023-01-31 DIAGNOSIS — G47.33 OBSTRUCTIVE SLEEP APNEA: Primary | ICD-10-CM

## 2023-01-31 DIAGNOSIS — E66.01 MORBID OBESITY: ICD-10-CM

## 2023-01-31 PROCEDURE — 99999 PR PBB SHADOW E&M-EST. PATIENT-LVL III: ICD-10-PCS | Mod: PBBFAC,,, | Performed by: NURSE PRACTITIONER

## 2023-01-31 PROCEDURE — 1159F PR MEDICATION LIST DOCUMENTED IN MEDICAL RECORD: ICD-10-PCS | Mod: CPTII,S$GLB,, | Performed by: NURSE PRACTITIONER

## 2023-01-31 PROCEDURE — 3074F SYST BP LT 130 MM HG: CPT | Mod: CPTII,S$GLB,, | Performed by: NURSE PRACTITIONER

## 2023-01-31 PROCEDURE — 99214 OFFICE O/P EST MOD 30 MIN: CPT | Mod: S$GLB,,, | Performed by: NURSE PRACTITIONER

## 2023-01-31 PROCEDURE — 3008F BODY MASS INDEX DOCD: CPT | Mod: CPTII,S$GLB,, | Performed by: NURSE PRACTITIONER

## 2023-01-31 PROCEDURE — 99214 PR OFFICE/OUTPT VISIT, EST, LEVL IV, 30-39 MIN: ICD-10-PCS | Mod: S$GLB,,, | Performed by: NURSE PRACTITIONER

## 2023-01-31 PROCEDURE — 3074F PR MOST RECENT SYSTOLIC BLOOD PRESSURE < 130 MM HG: ICD-10-PCS | Mod: CPTII,S$GLB,, | Performed by: NURSE PRACTITIONER

## 2023-01-31 PROCEDURE — 3078F DIAST BP <80 MM HG: CPT | Mod: CPTII,S$GLB,, | Performed by: NURSE PRACTITIONER

## 2023-01-31 PROCEDURE — 1159F MED LIST DOCD IN RCRD: CPT | Mod: CPTII,S$GLB,, | Performed by: NURSE PRACTITIONER

## 2023-01-31 PROCEDURE — 99999 PR PBB SHADOW E&M-EST. PATIENT-LVL III: CPT | Mod: PBBFAC,,, | Performed by: NURSE PRACTITIONER

## 2023-01-31 PROCEDURE — 3078F PR MOST RECENT DIASTOLIC BLOOD PRESSURE < 80 MM HG: ICD-10-PCS | Mod: CPTII,S$GLB,, | Performed by: NURSE PRACTITIONER

## 2023-01-31 PROCEDURE — 3008F PR BODY MASS INDEX (BMI) DOCUMENTED: ICD-10-PCS | Mod: CPTII,S$GLB,, | Performed by: NURSE PRACTITIONER

## 2023-01-31 NOTE — PROGRESS NOTES
Established GLENN Patient   SUBJECTIVE:    Patient ID: Fady Peralta , 62 y.o.    Past Medical History:   Diagnosis Date    Anxiety     Depression     Hyperlipidemia     Hypertension     Personal history of colonic polyps        Past Surgical History:   Procedure Laterality Date    COLONOSCOPY      KNEE ARTHROSCOPY Bilateral     TONSILLECTOMY         History reviewed. No pertinent family history.    Social History     Socioeconomic History    Marital status:    Tobacco Use    Smoking status: Never    Smokeless tobacco: Never   Substance and Sexual Activity    Alcohol use: Yes    Drug use: Never    Sexual activity: Yes       Review of patient's allergies indicates:   Allergen Reactions    Iodine Rash    Benzonatate      Other reaction(s): Numbness around mouth.....    Erythromycin      Other reaction(s): Abdominal pain    Penicillins Hives     Other reaction(s): Flushing    Sulfamethoxazole-trimethoprim      Other reaction(s): Abdominal pain    Clindamycin Nausea Only     Other reaction(s): Abdominal pain       Chief Complaint: GLENN f/u    History of Present Illness:     Here for 1 yr glenn f/u     Pt reports nightly usage of pap machine; pap therapy beneficial for sleep. Refreshed awakenings, denies EDS. Tolerating mask/pressure well.     Machine is old/obsolete. Unable to get parts for current machine so requesting pap replacement order    Needs supply order; needing to switch DME to [facility] [place].     Review of Systems - as per HPI, otherwise a balanced 10 systems review is negative.      Current Medications:    Current Outpatient Medications:     ALPRAZolam (XANAX) 0.25 MG tablet, Take 0.25 mg by mouth 3 (three) times daily., Disp: , Rfl:     celecoxib (CELEBREX) 200 MG capsule, Take 1 capsule (200 mg total) by mouth once daily., Disp: 90 capsule, Rfl: 3    CRESTOR 20 mg tablet, Take 20 mg by mouth once daily., Disp: , Rfl:     EScitalopram oxalate (LEXAPRO) 20 MG tablet, Take 20 mg by mouth once daily.,  Disp: , Rfl:     metoprolol succinate (TOPROL-XL) 50 MG 24 hr tablet, Take 1 tablet (50 mg total) by mouth once daily., Disp: 90 tablet, Rfl: 3    pravastatin (PRAVACHOL) 40 MG tablet, Take 1 tablet (40 mg total) by mouth nightly. (Patient not taking: Reported on 8/29/2022), Disp: 90 tablet, Rfl: 3      OBJECTIVE:    Vitals:  /74 (BP Location: Left arm, Patient Position: Sitting)   Ht 5' (1.524 m)   Wt 120.2 kg (265 lb)   BMI 51.75 kg/m²      Physical Exam:  Constitutional  she appears well-developed and well-nourished. she is well groomed.    Accompanied by - self  Appearance - well appearing, no apparent distress, unassisted  Heart - RRR auscultated without murmur  Lungs - CTA   Skin- no obvious lesions noted    Neurologic  Cortical function - The patient is alert, attentive, and oriented  Speech - clear   Cranial nerves:  CN 3, 4, 6 EOMs - normal. No ptosis or lateral gaze deviation  CN 7 - no face asymmetry; normal eye closure and smile  CN 8 - hearing is grossly normal  Motor - grossly normal  Gait - unassisted; posture upright. gait is steady with normal steps    Review of Data:      PAP Compliance Report  Last 30 days  Usage-100  Usage > 4 hrs - 100  AHI -2.7    EPWORTH SLEEPINESS SCALE 1/31/2023   Sitting and reading 3   Watching TV 2   Sitting, inactive in a public place (e.g. a theatre or a meeting) 0   As a passenger in a car for an hour without a break 1   Lying down to rest in the afternoon when circumstances permit 3   Sitting and talking to someone 0   Sitting quietly after a lunch without alcohol 2   In a car, while stopped for a few minutes in traffic 0   Total score 11       Labs:  Documentation Only on 12/20/2022   Component Date Value Ref Range Status    HPV DNA 09/12/2019 None Detected  None Detected Final          ASSESSMENT /PLAN:    Problem List Items Addressed This Visit          Other    Obstructive sleep apnea - Primary    Will send order for new autoPAP with pressures from 14-18  cm.    Encouraged nightly use of PAP. Discussed minimum insurance guidelines for PAP use    Reminded pt that drowsy driving may still occur despite PAP use.    Follow up requested in 3 mo. Instructed pt to call prior if needed          Questions and concerns were sought and answered to the patient's stated verbal satisfaction.    The patient verbalizes understanding and agreement with the above stated treatment plan.   Dr. Curtis was available during today's encounter.     Items discussed include acute and/or chronic neurological, sleep, or other issues and their attendant differential diagnoses.  Potential for additional testing, treatment options, and prognosis also discussed.    __*_single dx ___multiple issues/ diagnoses  ___ low __* mod ___ high complexity of data  ___low _*_mod ___ high risks     Medical Decision Making (MDM) used for CPT choice:  ___low  _*__moderate  ____high        LINDA Aggarwal  [facility]  [phone]

## 2023-03-14 DIAGNOSIS — I10 BENIGN ESSENTIAL HYPERTENSION: Primary | ICD-10-CM

## 2023-03-14 RX ORDER — METOPROLOL SUCCINATE 50 MG/1
50 TABLET, EXTENDED RELEASE ORAL DAILY
Qty: 90 TABLET | Refills: 3 | Status: SHIPPED | OUTPATIENT
Start: 2023-03-14 | End: 2023-06-09 | Stop reason: SDUPTHER

## 2023-04-04 ENCOUNTER — OFFICE VISIT (OUTPATIENT)
Dept: NEUROLOGY | Facility: CLINIC | Age: 63
End: 2023-04-04
Payer: COMMERCIAL

## 2023-04-04 VITALS
BODY MASS INDEX: 51.04 KG/M2 | DIASTOLIC BLOOD PRESSURE: 74 MMHG | WEIGHT: 260 LBS | HEIGHT: 60 IN | SYSTOLIC BLOOD PRESSURE: 122 MMHG

## 2023-04-04 DIAGNOSIS — G47.33 OBSTRUCTIVE SLEEP APNEA: Primary | ICD-10-CM

## 2023-04-04 PROCEDURE — 3074F SYST BP LT 130 MM HG: CPT | Mod: CPTII,S$GLB,, | Performed by: NURSE PRACTITIONER

## 2023-04-04 PROCEDURE — 1159F MED LIST DOCD IN RCRD: CPT | Mod: CPTII,S$GLB,, | Performed by: NURSE PRACTITIONER

## 2023-04-04 PROCEDURE — 3008F PR BODY MASS INDEX (BMI) DOCUMENTED: ICD-10-PCS | Mod: CPTII,S$GLB,, | Performed by: NURSE PRACTITIONER

## 2023-04-04 PROCEDURE — 3074F PR MOST RECENT SYSTOLIC BLOOD PRESSURE < 130 MM HG: ICD-10-PCS | Mod: CPTII,S$GLB,, | Performed by: NURSE PRACTITIONER

## 2023-04-04 PROCEDURE — 99213 PR OFFICE/OUTPT VISIT, EST, LEVL III, 20-29 MIN: ICD-10-PCS | Mod: S$GLB,,, | Performed by: NURSE PRACTITIONER

## 2023-04-04 PROCEDURE — 99999 PR PBB SHADOW E&M-EST. PATIENT-LVL III: ICD-10-PCS | Mod: PBBFAC,,, | Performed by: NURSE PRACTITIONER

## 2023-04-04 PROCEDURE — 3008F BODY MASS INDEX DOCD: CPT | Mod: CPTII,S$GLB,, | Performed by: NURSE PRACTITIONER

## 2023-04-04 PROCEDURE — 99999 PR PBB SHADOW E&M-EST. PATIENT-LVL III: CPT | Mod: PBBFAC,,, | Performed by: NURSE PRACTITIONER

## 2023-04-04 PROCEDURE — 1159F PR MEDICATION LIST DOCUMENTED IN MEDICAL RECORD: ICD-10-PCS | Mod: CPTII,S$GLB,, | Performed by: NURSE PRACTITIONER

## 2023-04-04 PROCEDURE — 99213 OFFICE O/P EST LOW 20 MIN: CPT | Mod: S$GLB,,, | Performed by: NURSE PRACTITIONER

## 2023-04-04 PROCEDURE — 3078F DIAST BP <80 MM HG: CPT | Mod: CPTII,S$GLB,, | Performed by: NURSE PRACTITIONER

## 2023-04-04 PROCEDURE — 3078F PR MOST RECENT DIASTOLIC BLOOD PRESSURE < 80 MM HG: ICD-10-PCS | Mod: CPTII,S$GLB,, | Performed by: NURSE PRACTITIONER

## 2023-04-04 NOTE — PROGRESS NOTES
Established GLENN Patient   SUBJECTIVE:    Patient ID: Fady Peralta , 63 y.o.    Past Medical History:   Diagnosis Date    Anxiety     Depression     Hyperlipidemia     Hypertension     Personal history of colonic polyps        Past Surgical History:   Procedure Laterality Date    COLONOSCOPY      KNEE ARTHROSCOPY Bilateral     TONSILLECTOMY         History reviewed. No pertinent family history.    Social History     Socioeconomic History    Marital status:    Tobacco Use    Smoking status: Never    Smokeless tobacco: Never   Substance and Sexual Activity    Alcohol use: Yes    Drug use: Never    Sexual activity: Yes       Review of patient's allergies indicates:   Allergen Reactions    Iodine Rash    Benzonatate      Other reaction(s): Numbness around mouth.....    Erythromycin      Other reaction(s): Abdominal pain    Penicillins Hives     Other reaction(s): Flushing    Sulfamethoxazole-trimethoprim      Other reaction(s): Abdominal pain    Clindamycin Nausea Only     Other reaction(s): Abdominal pain       Chief Complaint: GLENN f/u    History of Present Illness:     Here for 8 week glenn f/u     Pt reports nightly usage of pap machine; pap therapy beneficial for sleep. Refreshed awakenings, denies EDS. C/o incr in awakenings during night. Tolerating mask/pressure well. DME is [facility].    Review of Systems - as per HPI, otherwise a balanced 10 systems review is negative.      Current Medications:    Current Outpatient Medications:     ALPRAZolam (XANAX) 0.25 MG tablet, Take 0.25 mg by mouth 3 (three) times daily., Disp: , Rfl:     celecoxib (CELEBREX) 200 MG capsule, Take 1 capsule (200 mg total) by mouth once daily., Disp: 90 capsule, Rfl: 3    CRESTOR 20 mg tablet, Take 20 mg by mouth once daily., Disp: , Rfl:     EScitalopram oxalate (LEXAPRO) 20 MG tablet, Take 20 mg by mouth once daily., Disp: , Rfl:     metoprolol succinate (TOPROL-XL) 50 MG 24 hr tablet, TAKE 1 TABLET (50 MG TOTAL) BY MOUTH ONCE  DAILY., Disp: 90 tablet, Rfl: 3      OBJECTIVE:    Vitals:  /74 (BP Location: Left arm, Patient Position: Sitting)   Ht 5' (1.524 m)   Wt 117.9 kg (260 lb)   BMI 50.78 kg/m²      Physical Exam:  Constitutional  she appears well-developed and well-nourished. she is well groomed.    Accompanied by - self  Appearance - well appearing, no apparent distress, unassisted  Heart - RRR auscultated without murmur  Lungs - CTA   Skin- no obvious lesions noted    Neurologic  Cortical function - The patient is alert, attentive, and oriented  Speech - clear   Cranial nerves:  CN 3, 4, 6 EOMs - normal. No ptosis or lateral gaze deviation  CN 7 - no face asymmetry; normal eye closure and smile  CN 8 - hearing is grossly normal  Motor - grossly normal  Gait - unassisted; posture upright. gait is steady with normal steps    Review of Data:      PAP Compliance Report  Date range: 3/4/23-4/2/23   % of usage >= 4 hrs: 100   AHI: 2.1     EPWORTH SLEEPINESS SCALE 4/4/2023   Sitting and reading 3   Watching TV 2   Sitting, inactive in a public place (e.g. a theatre or a meeting) 0   As a passenger in a car for an hour without a break 0   Lying down to rest in the afternoon when circumstances permit 3   Sitting and talking to someone 0   Sitting quietly after a lunch without alcohol 0   In a car, while stopped for a few minutes in traffic 0   Total score 8            ASSESSMENT /PLAN:    Problem List Items Addressed This Visit          Other    Obstructive sleep apnea - Primary    - Continues to benefit from PAP therapy. Encouraged nightly use of PAP.   - Drowsy driving may still occur despite PAP use.   - F/u in 1 yr         Questions and concerns were sought and answered to the patient's stated verbal satisfaction.    The patient verbalizes understanding and agreement with the above stated treatment plan.   Dr. Curtis was available during today's encounter.     Items discussed include acute and/or chronic neurological, sleep,  or other issues and their attendant differential diagnoses.  Potential for additional testing, treatment options, and prognosis also discussed.    __*_single dx ___multiple issues/ diagnoses  ___ low __* mod ___ high complexity of data  __*_low __mod ___ high risks     Medical Decision Making (MDM) used for CPT choice:  _*__low  ___moderate  ____high        LINDA Aggarwal  [facility]  [phone]

## 2023-05-01 ENCOUNTER — PATIENT MESSAGE (OUTPATIENT)
Dept: ADMINISTRATIVE | Facility: HOSPITAL | Age: 63
End: 2023-05-01
Payer: COMMERCIAL

## 2023-06-01 ENCOUNTER — TELEPHONE (OUTPATIENT)
Dept: INTERNAL MEDICINE | Facility: CLINIC | Age: 63
End: 2023-06-01
Payer: COMMERCIAL

## 2023-06-01 NOTE — TELEPHONE ENCOUNTER
----- Message from Daria Chatman LPN sent at 6/1/2023 11:47 AM CDT -----  Regarding: danny Snyder 6/9 @9:00  Fasting labs needed.

## 2023-06-06 ENCOUNTER — LAB VISIT (OUTPATIENT)
Dept: LAB | Facility: HOSPITAL | Age: 63
End: 2023-06-06
Attending: INTERNAL MEDICINE
Payer: COMMERCIAL

## 2023-06-06 DIAGNOSIS — I10 BENIGN ESSENTIAL HYPERTENSION: ICD-10-CM

## 2023-06-06 DIAGNOSIS — E78.5 HYPERLIPIDEMIA, UNSPECIFIED HYPERLIPIDEMIA TYPE: ICD-10-CM

## 2023-06-06 DIAGNOSIS — Z00.00 WELLNESS EXAMINATION: ICD-10-CM

## 2023-06-06 DIAGNOSIS — J45.909 ASTHMA, UNSPECIFIED ASTHMA SEVERITY, UNSPECIFIED WHETHER COMPLICATED, UNSPECIFIED WHETHER PERSISTENT: ICD-10-CM

## 2023-06-06 LAB
ALBUMIN SERPL-MCNC: 4.1 G/DL (ref 3.4–4.8)
ALBUMIN/GLOB SERPL: 1.6 RATIO (ref 1.1–2)
ALP SERPL-CCNC: 72 UNIT/L (ref 40–150)
ALT SERPL-CCNC: 28 UNIT/L (ref 0–55)
AST SERPL-CCNC: 22 UNIT/L (ref 5–34)
BASOPHILS # BLD AUTO: 0.08 X10(3)/MCL
BASOPHILS NFR BLD AUTO: 1.5 %
BILIRUBIN DIRECT+TOT PNL SERPL-MCNC: 0.7 MG/DL
BUN SERPL-MCNC: 17.8 MG/DL (ref 9.8–20.1)
CALCIUM SERPL-MCNC: 9 MG/DL (ref 8.4–10.2)
CHLORIDE SERPL-SCNC: 104 MMOL/L (ref 98–107)
CHOLEST SERPL-MCNC: 183 MG/DL
CHOLEST/HDLC SERPL: 4 {RATIO} (ref 0–5)
CO2 SERPL-SCNC: 30 MMOL/L (ref 23–31)
CREAT SERPL-MCNC: 0.85 MG/DL (ref 0.55–1.02)
EOSINOPHIL # BLD AUTO: 0.23 X10(3)/MCL (ref 0–0.9)
EOSINOPHIL NFR BLD AUTO: 4.4 %
ERYTHROCYTE [DISTWIDTH] IN BLOOD BY AUTOMATED COUNT: 13.1 % (ref 11.5–17)
GFR SERPLBLD CREATININE-BSD FMLA CKD-EPI: >60 MLS/MIN/1.73/M2
GLOBULIN SER-MCNC: 2.6 GM/DL (ref 2.4–3.5)
GLUCOSE SERPL-MCNC: 94 MG/DL (ref 82–115)
HCT VFR BLD AUTO: 46.5 % (ref 37–47)
HDLC SERPL-MCNC: 43 MG/DL (ref 35–60)
HGB BLD-MCNC: 14.7 G/DL (ref 12–16)
IMM GRANULOCYTES # BLD AUTO: 0.02 X10(3)/MCL (ref 0–0.04)
IMM GRANULOCYTES NFR BLD AUTO: 0.4 %
LDLC SERPL CALC-MCNC: 121 MG/DL (ref 50–140)
LYMPHOCYTES # BLD AUTO: 1.5 X10(3)/MCL (ref 0.6–4.6)
LYMPHOCYTES NFR BLD AUTO: 28.9 %
MCH RBC QN AUTO: 31.3 PG (ref 27–31)
MCHC RBC AUTO-ENTMCNC: 31.6 G/DL (ref 33–36)
MCV RBC AUTO: 98.9 FL (ref 80–94)
MONOCYTES # BLD AUTO: 0.44 X10(3)/MCL (ref 0.1–1.3)
MONOCYTES NFR BLD AUTO: 8.5 %
NEUTROPHILS # BLD AUTO: 2.92 X10(3)/MCL (ref 2.1–9.2)
NEUTROPHILS NFR BLD AUTO: 56.3 %
NRBC BLD AUTO-RTO: 0 %
PLATELET # BLD AUTO: 164 X10(3)/MCL (ref 130–400)
PMV BLD AUTO: 12.8 FL (ref 7.4–10.4)
POTASSIUM SERPL-SCNC: 4.6 MMOL/L (ref 3.5–5.1)
PROT SERPL-MCNC: 6.7 GM/DL (ref 5.8–7.6)
RBC # BLD AUTO: 4.7 X10(6)/MCL (ref 4.2–5.4)
SODIUM SERPL-SCNC: 140 MMOL/L (ref 136–145)
TRIGL SERPL-MCNC: 93 MG/DL (ref 37–140)
TSH SERPL-ACNC: 2.46 UIU/ML (ref 0.35–4.94)
VLDLC SERPL CALC-MCNC: 19 MG/DL
WBC # SPEC AUTO: 5.19 X10(3)/MCL (ref 4.5–11.5)

## 2023-06-06 PROCEDURE — 36415 COLL VENOUS BLD VENIPUNCTURE: CPT

## 2023-06-06 PROCEDURE — 85025 COMPLETE CBC W/AUTO DIFF WBC: CPT

## 2023-06-06 PROCEDURE — 80053 COMPREHEN METABOLIC PANEL: CPT

## 2023-06-06 PROCEDURE — 84443 ASSAY THYROID STIM HORMONE: CPT

## 2023-06-06 PROCEDURE — 80061 LIPID PANEL: CPT

## 2023-06-09 ENCOUNTER — OFFICE VISIT (OUTPATIENT)
Dept: INTERNAL MEDICINE | Facility: CLINIC | Age: 63
End: 2023-06-09
Payer: COMMERCIAL

## 2023-06-09 VITALS
HEART RATE: 63 BPM | SYSTOLIC BLOOD PRESSURE: 124 MMHG | WEIGHT: 268 LBS | OXYGEN SATURATION: 95 % | RESPIRATION RATE: 18 BRPM | BODY MASS INDEX: 52.61 KG/M2 | DIASTOLIC BLOOD PRESSURE: 70 MMHG | HEIGHT: 60 IN

## 2023-06-09 DIAGNOSIS — G47.33 OBSTRUCTIVE SLEEP APNEA: ICD-10-CM

## 2023-06-09 DIAGNOSIS — Z00.00 WELLNESS EXAMINATION: Primary | ICD-10-CM

## 2023-06-09 DIAGNOSIS — I10 BENIGN ESSENTIAL HYPERTENSION: ICD-10-CM

## 2023-06-09 DIAGNOSIS — Z23 NEED FOR SHINGLES VACCINE: Primary | ICD-10-CM

## 2023-06-09 DIAGNOSIS — E66.01 MORBID OBESITY: ICD-10-CM

## 2023-06-09 DIAGNOSIS — E78.5 HYPERLIPIDEMIA, UNSPECIFIED HYPERLIPIDEMIA TYPE: ICD-10-CM

## 2023-06-09 DIAGNOSIS — J45.909 ASTHMA, UNSPECIFIED ASTHMA SEVERITY, UNSPECIFIED WHETHER COMPLICATED, UNSPECIFIED WHETHER PERSISTENT: ICD-10-CM

## 2023-06-09 PROCEDURE — 1159F PR MEDICATION LIST DOCUMENTED IN MEDICAL RECORD: ICD-10-PCS | Mod: CPTII,,, | Performed by: INTERNAL MEDICINE

## 2023-06-09 PROCEDURE — 1159F MED LIST DOCD IN RCRD: CPT | Mod: CPTII,,, | Performed by: INTERNAL MEDICINE

## 2023-06-09 PROCEDURE — 90471 IMMUNIZATION ADMIN: CPT | Mod: ,,, | Performed by: INTERNAL MEDICINE

## 2023-06-09 PROCEDURE — 99396 PREV VISIT EST AGE 40-64: CPT | Mod: 25,,, | Performed by: INTERNAL MEDICINE

## 2023-06-09 PROCEDURE — 3008F BODY MASS INDEX DOCD: CPT | Mod: CPTII,,, | Performed by: INTERNAL MEDICINE

## 2023-06-09 PROCEDURE — 90471 ZOSTER RECOMBINANT VACCINE: ICD-10-PCS | Mod: ,,, | Performed by: INTERNAL MEDICINE

## 2023-06-09 PROCEDURE — 3078F PR MOST RECENT DIASTOLIC BLOOD PRESSURE < 80 MM HG: ICD-10-PCS | Mod: CPTII,,, | Performed by: INTERNAL MEDICINE

## 2023-06-09 PROCEDURE — 3074F SYST BP LT 130 MM HG: CPT | Mod: CPTII,,, | Performed by: INTERNAL MEDICINE

## 2023-06-09 PROCEDURE — 90750 ZOSTER RECOMBINANT VACCINE: ICD-10-PCS | Mod: ,,, | Performed by: INTERNAL MEDICINE

## 2023-06-09 PROCEDURE — 3074F PR MOST RECENT SYSTOLIC BLOOD PRESSURE < 130 MM HG: ICD-10-PCS | Mod: CPTII,,, | Performed by: INTERNAL MEDICINE

## 2023-06-09 PROCEDURE — 3078F DIAST BP <80 MM HG: CPT | Mod: CPTII,,, | Performed by: INTERNAL MEDICINE

## 2023-06-09 PROCEDURE — 90750 HZV VACC RECOMBINANT IM: CPT | Mod: ,,, | Performed by: INTERNAL MEDICINE

## 2023-06-09 PROCEDURE — 3008F PR BODY MASS INDEX (BMI) DOCUMENTED: ICD-10-PCS | Mod: CPTII,,, | Performed by: INTERNAL MEDICINE

## 2023-06-09 PROCEDURE — 99396 PR PREVENTIVE VISIT,EST,40-64: ICD-10-PCS | Mod: 25,,, | Performed by: INTERNAL MEDICINE

## 2023-06-09 RX ORDER — CELECOXIB 200 MG/1
200 CAPSULE ORAL DAILY
Qty: 90 CAPSULE | Refills: 3 | Status: SHIPPED | OUTPATIENT
Start: 2023-06-09

## 2023-06-09 RX ORDER — METOPROLOL SUCCINATE 50 MG/1
50 TABLET, EXTENDED RELEASE ORAL DAILY
Qty: 90 TABLET | Refills: 3 | Status: SHIPPED | OUTPATIENT
Start: 2023-06-09

## 2023-06-09 NOTE — PROGRESS NOTES
Subjective:       Patient ID: Fady Peralta is a 63 y.o. female.    Chief Complaint: Annual Exam      The patient is a 63-year-old woman in for wellness check.  She feels okay overall.  She is been relatively inactive and gained a few more lb.      She tells me she saw her cardiologist after she saw me last year.  He change her from pravastatin to Crestor.  She tolerates that well.    She is requesting referral to another gynecologist.  She has not seen 1 since 2019 and  that gynecologist has since retired.    Review of Systems     Current Outpatient Medications on File Prior to Visit   Medication Sig Dispense Refill    ALPRAZolam (XANAX) 0.25 MG tablet Take 0.25 mg by mouth 3 (three) times daily.      CRESTOR 20 mg tablet Take 20 mg by mouth once daily.      EScitalopram oxalate (LEXAPRO) 20 MG tablet Take 20 mg by mouth once daily.      [DISCONTINUED] celecoxib (CELEBREX) 200 MG capsule Take 1 capsule (200 mg total) by mouth once daily. 90 capsule 3    [DISCONTINUED] metoprolol succinate (TOPROL-XL) 50 MG 24 hr tablet TAKE 1 TABLET (50 MG TOTAL) BY MOUTH ONCE DAILY. 90 tablet 3     No current facility-administered medications on file prior to visit.     Objective:      /70 (BP Location: Right arm, Patient Position: Sitting, BP Method: X-Large (Manual))   Pulse 63   Resp 18   Ht 5' (1.524 m)   Wt 121.6 kg (268 lb)   SpO2 95%   BMI 52.34 kg/m²     Physical Exam  Vitals reviewed.   Constitutional:       Appearance: Normal appearance.   HENT:      Head: Normocephalic.      Nose: Nose normal.      Mouth/Throat:      Pharynx: Oropharynx is clear.   Eyes:      Pupils: Pupils are equal, round, and reactive to light.   Neck:      Thyroid: No thyromegaly.   Cardiovascular:      Rate and Rhythm: Normal rate and regular rhythm.      Pulses: Normal pulses.   Abdominal:      General: Abdomen is flat. Bowel sounds are normal.      Palpations: Abdomen is soft. There is no hepatomegaly, splenomegaly or mass.       Tenderness: There is no abdominal tenderness.   Musculoskeletal:      Cervical back: Neck supple.   Lymphadenopathy:      Cervical: No cervical adenopathy.   Skin:     General: Skin is warm and dry.   Neurological:      Mental Status: She is alert.     Laboratory studies reviewed  Assessment:       1. Wellness.      2. Hypertension.  Controlled on Toprol    3. Hyperlipidemia.  Surprisingly worse on Crestor.  She had been on pravastatin with much better numbers last year.  This was changed by her cardiologist.    4. Obstructive sleep apnea on CPAP.  Doing well    5. History of depression.  Followed by her psychiatrist.  Plan:     Same meds.  Lipid meds per Cardiology.  Follow-up with me yearly with CBC CMP lipid TSH UA prior.  Refer to gyn for routine gyn care.      She is due for colonoscopy but refuses to do it until next year for financial reasons.

## 2023-06-13 ENCOUNTER — TELEPHONE (OUTPATIENT)
Dept: INTERNAL MEDICINE | Facility: CLINIC | Age: 63
End: 2023-06-13
Payer: COMMERCIAL

## 2023-06-13 NOTE — TELEPHONE ENCOUNTER
----- Message from Joshuakelley Fran sent at 6/13/2023  1:57 PM CDT -----  .Type:  Needs Medical Advice    Who Called: pt   Symptoms (please be specific):  swollen injection site   How long has patient had these symptoms:    Pharmacy name and phone #:    Would the patient rather a call back or a response via [facility]? Call back   Best Call Back Number: [phone] or [phone] option 5  Additional Information: pt states that she had a reaction from the Shingles shot that she took. Her arm was swollen at the shot site, redness, hot to touch, and the size of a baseball. Pt took Tylenol for the pain. Pt states that its getting better and not completely gone.   Call the back lines and got no answer

## 2023-07-24 ENCOUNTER — PATIENT MESSAGE (OUTPATIENT)
Dept: ADMINISTRATIVE | Facility: HOSPITAL | Age: 63
End: 2023-07-24
Payer: COMMERCIAL

## 2023-09-07 ENCOUNTER — PATIENT MESSAGE (OUTPATIENT)
Dept: RESEARCH | Facility: HOSPITAL | Age: 63
End: 2023-09-07
Payer: COMMERCIAL

## 2023-10-25 ENCOUNTER — PATIENT MESSAGE (OUTPATIENT)
Dept: ADMINISTRATIVE | Facility: HOSPITAL | Age: 63
End: 2023-10-25
Payer: COMMERCIAL

## 2023-12-04 ENCOUNTER — OFFICE VISIT (OUTPATIENT)
Dept: INTERNAL MEDICINE | Facility: CLINIC | Age: 63
End: 2023-12-04
Payer: COMMERCIAL

## 2023-12-04 VITALS
RESPIRATION RATE: 18 BRPM | SYSTOLIC BLOOD PRESSURE: 122 MMHG | DIASTOLIC BLOOD PRESSURE: 68 MMHG | BODY MASS INDEX: 51.99 KG/M2 | TEMPERATURE: 99 F | HEIGHT: 60 IN | WEIGHT: 264.81 LBS | OXYGEN SATURATION: 96 % | HEART RATE: 63 BPM

## 2023-12-04 DIAGNOSIS — J45.909 ASTHMA, UNSPECIFIED ASTHMA SEVERITY, UNSPECIFIED WHETHER COMPLICATED, UNSPECIFIED WHETHER PERSISTENT: ICD-10-CM

## 2023-12-04 DIAGNOSIS — E78.5 HYPERLIPIDEMIA, UNSPECIFIED HYPERLIPIDEMIA TYPE: ICD-10-CM

## 2023-12-04 DIAGNOSIS — J06.9 UPPER RESPIRATORY TRACT INFECTION, UNSPECIFIED TYPE: Primary | ICD-10-CM

## 2023-12-04 DIAGNOSIS — R50.9 FEVER, UNSPECIFIED FEVER CAUSE: Primary | ICD-10-CM

## 2023-12-04 DIAGNOSIS — J02.9 SORE THROAT: ICD-10-CM

## 2023-12-04 DIAGNOSIS — R50.9 FEVER, UNSPECIFIED FEVER CAUSE: ICD-10-CM

## 2023-12-04 DIAGNOSIS — I10 BENIGN ESSENTIAL HYPERTENSION: ICD-10-CM

## 2023-12-04 DIAGNOSIS — Z20.828 EXPOSURE TO RESPIRATORY SYNCYTIAL VIRUS (RSV): Primary | ICD-10-CM

## 2023-12-04 PROCEDURE — 3074F SYST BP LT 130 MM HG: CPT | Mod: CPTII,,, | Performed by: INTERNAL MEDICINE

## 2023-12-04 PROCEDURE — 3008F BODY MASS INDEX DOCD: CPT | Mod: CPTII,,, | Performed by: INTERNAL MEDICINE

## 2023-12-04 PROCEDURE — 3008F PR BODY MASS INDEX (BMI) DOCUMENTED: ICD-10-PCS | Mod: CPTII,,, | Performed by: INTERNAL MEDICINE

## 2023-12-04 PROCEDURE — 3078F PR MOST RECENT DIASTOLIC BLOOD PRESSURE < 80 MM HG: ICD-10-PCS | Mod: CPTII,,, | Performed by: INTERNAL MEDICINE

## 2023-12-04 PROCEDURE — 3078F DIAST BP <80 MM HG: CPT | Mod: CPTII,,, | Performed by: INTERNAL MEDICINE

## 2023-12-04 PROCEDURE — 1159F MED LIST DOCD IN RCRD: CPT | Mod: CPTII,,, | Performed by: INTERNAL MEDICINE

## 2023-12-04 PROCEDURE — 99213 OFFICE O/P EST LOW 20 MIN: CPT | Mod: ,,, | Performed by: INTERNAL MEDICINE

## 2023-12-04 PROCEDURE — 3074F PR MOST RECENT SYSTOLIC BLOOD PRESSURE < 130 MM HG: ICD-10-PCS | Mod: CPTII,,, | Performed by: INTERNAL MEDICINE

## 2023-12-04 PROCEDURE — 1159F PR MEDICATION LIST DOCUMENTED IN MEDICAL RECORD: ICD-10-PCS | Mod: CPTII,,, | Performed by: INTERNAL MEDICINE

## 2023-12-04 PROCEDURE — 99213 PR OFFICE/OUTPT VISIT, EST, LEVL III, 20-29 MIN: ICD-10-PCS | Mod: ,,, | Performed by: INTERNAL MEDICINE

## 2023-12-04 RX ORDER — EZETIMIBE 10 MG/1
10 TABLET ORAL DAILY
COMMUNITY
Start: 2023-11-16

## 2023-12-04 NOTE — PROGRESS NOTES
Subjective:       Patient ID: Fady Peralta is a 63 y.o. female.    Chief Complaint: Cough (Sore throat)      The patient is a 63-year-old woman in for evaluation of an upper respiratory tract infection.  For the last day or so she is had a scratchy throat and nasal congestion and clear rhinorrhea.  Also a dry cough.  She was exposed to grandkids who had RSV.  This exposure occurred about 8 days ago.  Low-grade fever only.  She does have a history of asthma but that has not been exacerbated thus far.    Cough      Review of Systems   Respiratory:  Positive for cough.         Current Outpatient Medications on File Prior to Visit   Medication Sig Dispense Refill    ALPRAZolam (XANAX) 0.25 MG tablet Take 0.25 mg by mouth 3 (three) times daily.      celecoxib (CELEBREX) 200 MG capsule Take 1 capsule (200 mg total) by mouth once daily. 90 capsule 3    CRESTOR 20 mg tablet Take 20 mg by mouth once daily.      EScitalopram oxalate (LEXAPRO) 20 MG tablet Take 20 mg by mouth once daily.      ezetimibe (ZETIA) 10 mg tablet Take 10 mg by mouth once daily.      metoprolol succinate (TOPROL-XL) 50 MG 24 hr tablet Take 1 tablet (50 mg total) by mouth once daily. 90 tablet 3     No current facility-administered medications on file prior to visit.     Objective:      /68 (BP Location: Right arm, Patient Position: Sitting, BP Method: X-Large (Manual))   Pulse 63   Temp 99.1 °F (37.3 °C) (Oral)   Resp 18   Ht 5' (1.524 m)   Wt 120.1 kg (264 lb 12.8 oz)   SpO2 96%   BMI 51.72 kg/m²     Physical Exam  Vitals reviewed.   Constitutional:       Appearance: Normal appearance.   HENT:      Head: Normocephalic.      Comments: Clear rhinorrhea from nose.  Throat is clear.     Ears:      Comments: Left TM clear.  Right TM obscured with cerumen.     Nose: Nose normal.      Mouth/Throat:      Pharynx: Oropharynx is clear.   Eyes:      Pupils: Pupils are equal, round, and reactive to light.   Neck:      Thyroid: No thyromegaly.    Cardiovascular:      Rate and Rhythm: Normal rate and regular rhythm.      Pulses: Normal pulses.   Pulmonary:      Comments: Essentially clear.  Abdominal:      General: Abdomen is flat. Bowel sounds are normal.      Palpations: Abdomen is soft. There is no hepatomegaly, splenomegaly or mass.      Tenderness: There is no abdominal tenderness.   Musculoskeletal:      Cervical back: Neck supple.   Lymphadenopathy:      Cervical: No cervical adenopathy.   Skin:     General: Skin is warm and dry.   Neurological:      Mental Status: She is alert.         Assessment:       1. Upper respiratory tract infection.  She did have exposure to RSV.  Also influenza a is increasing in the community and COVID is still around.    2. Asthma.  No evidence of exacerbation thus far    3. Obesity.  Plan:     Over-the-counter symptomatic treatment.  Check swab for RSV/flu/COVID.      Follow-up with me as scheduled.

## 2024-01-25 ENCOUNTER — PATIENT MESSAGE (OUTPATIENT)
Dept: ADMINISTRATIVE | Facility: HOSPITAL | Age: 64
End: 2024-01-25
Payer: COMMERCIAL

## 2024-02-07 ENCOUNTER — PATIENT OUTREACH (OUTPATIENT)
Dept: ADMINISTRATIVE | Facility: HOSPITAL | Age: 64
End: 2024-02-07
Payer: COMMERCIAL

## 2024-02-07 NOTE — PROGRESS NOTES
Population Health Outreach.    2/7/2024 Patient was called concerning colon cancer screening and mammogram. Left Message.

## 2024-03-27 ENCOUNTER — HOSPITAL ENCOUNTER (OUTPATIENT)
Dept: RADIOLOGY | Facility: HOSPITAL | Age: 64
Discharge: HOME | End: 2024-03-27
Attending: OBSTETRICS & GYNECOLOGY
Payer: COMMERCIAL

## 2024-03-27 DIAGNOSIS — Z12.31 ENCOUNTER FOR SCREENING MAMMOGRAM FOR MALIGNANT NEOPLASM OF BREAST: ICD-10-CM

## 2024-03-27 PROCEDURE — 77067 SCR MAMMO BI INCL CAD: CPT | Mod: 26,,, | Performed by: RADIOLOGY

## 2024-03-27 PROCEDURE — 77063 BREAST TOMOSYNTHESIS BI: CPT | Mod: TC

## 2024-03-27 PROCEDURE — 77063 BREAST TOMOSYNTHESIS BI: CPT | Mod: 26,,, | Performed by: RADIOLOGY

## 2024-04-09 ENCOUNTER — OFFICE VISIT (OUTPATIENT)
Dept: NEUROLOGY | Facility: CLINIC | Age: 64
End: 2024-04-09
Payer: COMMERCIAL

## 2024-04-09 VITALS
DIASTOLIC BLOOD PRESSURE: 82 MMHG | SYSTOLIC BLOOD PRESSURE: 142 MMHG | HEIGHT: 60 IN | BODY MASS INDEX: 52.03 KG/M2 | WEIGHT: 265 LBS

## 2024-04-09 DIAGNOSIS — E66.01 MORBID OBESITY: ICD-10-CM

## 2024-04-09 DIAGNOSIS — G47.33 OBSTRUCTIVE SLEEP APNEA: Primary | ICD-10-CM

## 2024-04-09 PROCEDURE — 99999 PR PBB SHADOW E&M-EST. PATIENT-LVL III: CPT | Mod: PBBFAC,,, | Performed by: NURSE PRACTITIONER

## 2024-04-09 PROCEDURE — 3008F BODY MASS INDEX DOCD: CPT | Mod: CPTII,S$GLB,, | Performed by: NURSE PRACTITIONER

## 2024-04-09 PROCEDURE — 3079F DIAST BP 80-89 MM HG: CPT | Mod: CPTII,S$GLB,, | Performed by: NURSE PRACTITIONER

## 2024-04-09 PROCEDURE — 1159F MED LIST DOCD IN RCRD: CPT | Mod: CPTII,S$GLB,, | Performed by: NURSE PRACTITIONER

## 2024-04-09 PROCEDURE — 3077F SYST BP >= 140 MM HG: CPT | Mod: CPTII,S$GLB,, | Performed by: NURSE PRACTITIONER

## 2024-04-09 PROCEDURE — 99213 OFFICE O/P EST LOW 20 MIN: CPT | Mod: S$GLB,,, | Performed by: NURSE PRACTITIONER

## 2024-04-09 NOTE — PROGRESS NOTES
Screening Questions  BLUE  KIND OF PREP RED  LOCATION [review exclusion criteria] GREEN  SEDATION TYPE        Y Are you active on mychart?       Sydnie Daley MD   Ordering/Referring Provider?        PO What type of coverage do you have?      N Have you had a positive covid test in the last 90 days?     40.3 1. BMI  [BMI 40+ - review exclusion criteria]    Y  2. Are you able to give consent for your medical care? [IF NO,RN REVIEW]        N  3. Are you taking any prescription pain medications on a routine schedule?        3a. EXTENDED PREP What kind of prescription?     N 4. Do you have any chemical dependencies such as alcohol, street drugs, or methadone?    N 5. Do you have any history of post-traumatic stress syndrome, severe anxiety or history of psychosis?      **If yes 3- 5 , please schedule with MAC sedation.**          IF YES TO ANY 6 - 10 - HOSPITAL SETTING ONLY.     N 6.   Do you need assistance transferring?     N 7.   Have you had a heart or lung transplant?    N 8.   Are you currently on dialysis?   N 9.   Do you use daily home oxygen?   N 10. Do you take nitroglycerin?   10a.  If yes, how often?     11. [FEMALES]  N Are you currently pregnant?    11a.  If yes, how many weeks? [ Greater than 12 weeks, OR NEEDED]    N 12. Do you have Pulmonary Hypertension? *NEED PAC APPT AT [facility]*     N 13. [review exclusion criteria]  Do you have any implantable devices in your body (pacemaker, defib, LVAD)?    N 14. In the past 6 months, have you had any heart related issues including cardiomyopathy or heart attack?     14a.  If yes, did it require cardiac stenting if so when?     N 15. Have you had a stroke or Transient ischemic attack (TIA - aka  mini stroke ) within 6 months?      N 16. Do you have mod to severe Obstructive Sleep Apnea?  [Hospital only - Ok at [facility]]    N 17. Do you have SEVERE AND UNCONTROLLED asthma? *NEED PAC APPT AT [facility]*     N 18. Are you currently taking any blood thinners?       Established GLENN Patient   SUBJECTIVE:    Patient ID: Fady Peralta , 64 y.o.    Past Medical History:   Diagnosis Date    Anxiety     Depression     Hyperlipidemia     Hypertension     Personal history of colonic polyps        Past Surgical History:   Procedure Laterality Date    COLONOSCOPY      KNEE ARTHROSCOPY Bilateral     TONSILLECTOMY         Review of patient's allergies indicates:   Allergen Reactions    Iodine Rash    Benzonatate      Other reaction(s): Numbness around mouth.....    Erythromycin      Other reaction(s): Abdominal pain    Penicillins Hives     Other reaction(s): Flushing    Sulfamethoxazole-trimethoprim      Other reaction(s): Abdominal pain    Clindamycin Nausea Only     Other reaction(s): Abdominal pain       Chief Complaint: GLENN f/u    History of Present Illness:     Here for PAP follow up. Sleeping well at night with PAP. Feels better the following day after PAP use; denies EDS. Sleeps 8-9 hrs and awakens 1 time due to be hot and is able to go right back to sleep.     Denies problems with PAP machine. Able to get supplies at [facility].     Review of Systems - as per HPI, otherwise a balanced 10 systems review is negative.      Current Medications:  Current Outpatient Medications   Medication Instructions    ALPRAZolam (XANAX) 0.25 mg, Oral, 2 times daily    celecoxib (CELEBREX) 200 mg, Oral, Daily    CRESTOR 20 mg, Oral, Daily    EScitalopram oxalate (LEXAPRO) 20 mg, Oral, Daily    ezetimibe (ZETIA) 10 mg, Oral, Daily    metoprolol succinate (TOPROL-XL) 50 mg, Oral, Daily         OBJECTIVE:    Vitals:  BP (!) 142/82   Ht 5' (1.524 m)   Wt 120.2 kg (265 lb)   BMI 51.75 kg/m²      Physical Exam:  Constitutional  she appears well-developed and well-nourished. she is well groomed.    Accompanied by - self  Appearance - well appearing, no apparent distress, unassisted  Heart - RRR auscultated without murmur  Skin- no obvious lesions noted    Neurologic  Cortical function - The patient  " 18a. If yes, inform patient to \"follow up w/ ordering provider for bridging instructions.\"    N 19. Do you take the medication Phentermine?    19a. If yes, \"Hold for 7 days before procedure.  Please consult your prescribing provider if you have questions about holding this medication.\"     N  20. Do you have chronic kidney disease?      N  21. Do you have a diagnosis of diabetes?     N  22. On a regular basis do you go 3-5 days between bowel movements?      23. Preferred LOCAL Pharmacy for Pre Prescription    [ LIST ONLY ONE PHARMACY]       [facility]Y #[id number] - [place], [place] - [address] [address].        - CLOSING REMINDERS -    Informed patient they will need an adult    Cannot take any type of public or medical transportation alone    Conscious Sedation- Needs  for 6 hours after the procedure       MAC/General-Needs  for 24 hours after procedure    Pre-Procedure Covid test to be completed [[facility] PCR Testing Required]    Confirmed Nurse will call to complete assessment       - SCHEDULING DETAILS -     BG  Surgeon    1/10  Date of Procedure  Lower Endoscopy [Colonoscopy]  Type of Procedure Scheduled    Location  GOLYTELY EXTENDED - If you answer yes to questions #1, #3, #22 (De Carmenen and CF pts)Which Colonoscopy Prep was Sent?     MODERATE Sedation Type     N PAC / Pre-op Required         Additional comments:            " is alert, attentive   Speech - clear   Cranial nerves:  CN 3, 4, 6 EOMs - normal. No ptosis or lateral gaze deviation  CN 7 - no face asymmetry   CN 8 - hearing is grossly normal  Gait - unassisted; posture upright. gait is steady with normal steps    Review of Data:      PAP Compliance Report  Last 30 days  Usage- 100  Usage > 4 hrs - 100  AHI - 2.3        4/9/2024     3:06 PM   EPWORTH SLEEPINESS SCALE   Sitting and reading 3   Watching TV 3   Sitting, inactive in a public place (e.g. a theatre or a meeting) 0   As a passenger in a car for an hour without a break 1   Lying down to rest in the afternoon when circumstances permit 3   Sitting and talking to someone 0   Sitting quietly after a lunch without alcohol 1   In a car, while stopped for a few minutes in traffic 0   Total score 11          ASSESSMENT /PLAN:    Problem List Items Addressed This Visit          Other    Obstructive sleep apnea - Primary    - Continues to benefit from PAP therapy. Encouraged nightly use of PAP.  - Pt will portal message me with the [employer] company she'd like to use. Will generate order at that time   - Drowsy driving may still occur despite PAP use.   - F/u in 1 yr       Questions and concerns were sought and answered to the patient's stated verbal satisfaction.    The patient verbalizes understanding and agreement with the above stated treatment plan.   Items discussed include acute and/or chronic neurological, sleep, or other issues and their attendant differential diagnoses.  Potential for additional testing, treatment options, and prognosis also discussed.    __*_single dx ___multiple issues/ diagnoses  ___ low __* mod ___ high complexity of data  __*_low __mod ___ high risks     Medical Decision Making (MDM) used for CPT choice:  _*__low  ___moderate  ____high        LINDA Aggarwal  [facility]  [phone]

## 2024-04-26 ENCOUNTER — HOSPITAL ENCOUNTER (OUTPATIENT)
Dept: RADIOLOGY | Facility: CLINIC | Age: 64
Discharge: HOME | End: 2024-04-26
Attending: PHYSICIAN ASSISTANT
Payer: COMMERCIAL

## 2024-04-26 ENCOUNTER — OFFICE VISIT (OUTPATIENT)
Dept: ORTHOPEDICS | Facility: CLINIC | Age: 64
End: 2024-04-26
Payer: COMMERCIAL

## 2024-04-26 VITALS
WEIGHT: 267.19 LBS | BODY MASS INDEX: 52.46 KG/M2 | HEART RATE: 60 BPM | HEIGHT: 60 IN | SYSTOLIC BLOOD PRESSURE: 131 MMHG | DIASTOLIC BLOOD PRESSURE: 66 MMHG

## 2024-04-26 DIAGNOSIS — E66.01 MORBID OBESITY: ICD-10-CM

## 2024-04-26 DIAGNOSIS — M25.562 PAIN IN BOTH KNEES, UNSPECIFIED CHRONICITY: ICD-10-CM

## 2024-04-26 DIAGNOSIS — M25.561 PAIN IN BOTH KNEES, UNSPECIFIED CHRONICITY: ICD-10-CM

## 2024-04-26 DIAGNOSIS — M17.0 PRIMARY OSTEOARTHRITIS OF BOTH KNEES: Primary | ICD-10-CM

## 2024-04-26 PROCEDURE — 3075F SYST BP GE 130 - 139MM HG: CPT | Mod: CPTII,,, | Performed by: PHYSICIAN ASSISTANT

## 2024-04-26 PROCEDURE — 3008F BODY MASS INDEX DOCD: CPT | Mod: CPTII,,, | Performed by: PHYSICIAN ASSISTANT

## 2024-04-26 PROCEDURE — 3078F DIAST BP <80 MM HG: CPT | Mod: CPTII,,, | Performed by: PHYSICIAN ASSISTANT

## 2024-04-26 PROCEDURE — 1101F PT FALLS ASSESS-DOCD LE1/YR: CPT | Mod: CPTII,,, | Performed by: PHYSICIAN ASSISTANT

## 2024-04-26 PROCEDURE — 73564 X-RAY EXAM KNEE 4 OR MORE: CPT | Mod: 50,,, | Performed by: PHYSICIAN ASSISTANT

## 2024-04-26 PROCEDURE — 3288F FALL RISK ASSESSMENT DOCD: CPT | Mod: CPTII,,, | Performed by: PHYSICIAN ASSISTANT

## 2024-04-26 PROCEDURE — 99213 OFFICE O/P EST LOW 20 MIN: CPT | Mod: ,,, | Performed by: PHYSICIAN ASSISTANT

## 2024-04-26 PROCEDURE — 1160F RVW MEDS BY RX/DR IN RCRD: CPT | Mod: CPTII,,, | Performed by: PHYSICIAN ASSISTANT

## 2024-04-26 PROCEDURE — 1159F MED LIST DOCD IN RCRD: CPT | Mod: CPTII,,, | Performed by: PHYSICIAN ASSISTANT

## 2024-04-26 NOTE — PROGRESS NOTES
Chief Complaint:   Chief Complaint   Patient presents with    Knee Pain     Theodore knee pain, last synvsc injection was in 2019 with relief and lasted 6 months, reports got worse since last visit, wants to discuss PT/Injections/Increase meds       History of present illness:    64-year-old female presents office today for evaluation of bilateral knee pain.  She has a known history of osteoarthritis and wants to see if she has a candidate for repeat Synvisc injections.  She has had these in the past which have helped.  No relief with prior cortisone injections.  Her pain is medial-sided and worse with weight-bearing activity.    Past Medical History:   Diagnosis Date    Anxiety     Depression     Hyperlipidemia     Hypertension     Personal history of colonic polyps        Past Surgical History:   Procedure Laterality Date    ADENOIDECTOMY  1960s    COLONOSCOPY      KNEE ARTHROSCOPY Bilateral     TONSILLECTOMY         Current Outpatient Medications   Medication Sig Dispense Refill    ALPRAZolam (XANAX) 0.25 MG tablet Take 0.25 mg by mouth 2 (two) times a day.      celecoxib (CELEBREX) 200 MG capsule Take 1 capsule (200 mg total) by mouth once daily. 90 capsule 3    CRESTOR 20 mg tablet Take 20 mg by mouth once daily.      EScitalopram oxalate (LEXAPRO) 20 MG tablet Take 20 mg by mouth once daily.      ezetimibe (ZETIA) 10 mg tablet Take 10 mg by mouth once daily.      metoprolol succinate (TOPROL-XL) 50 MG 24 hr tablet Take 1 tablet (50 mg total) by mouth once daily. 90 tablet 3     No current facility-administered medications for this visit.       Review of patient's allergies indicates:   Allergen Reactions    Iodine Rash    Benzonatate      Other reaction(s): Numbness around mouth.....    Erythromycin      Other reaction(s): Abdominal pain    Penicillins Hives     Other reaction(s): Flushing    Sulfamethoxazole-trimethoprim      Other reaction(s): Abdominal pain    Clindamycin Nausea Only     Other reaction(s):  "Abdominal pain       No family history on file.    Social History     Socioeconomic History    Marital status:    Tobacco Use    Smoking status: Never    Smokeless tobacco: Never   Substance and Sexual Activity    Alcohol use: Yes    Drug use: Never    Sexual activity: Yes     Partners: Male         Review of Systems:    Constitution:   Denies chills, fever, and sweats.  HENT:   Denies headaches or blurry vision.  Cardiovascular:  Denies chest pain or irregular heart beat.  Respiratory:   Denies cough or shortness of breath.  Gastrointestinal:  Denies abdominal pain, nausea, or vomiting.  Musculoskeletal:   Denies muscle cramps.  Neurological:   Denies dizziness or focal weakness.  Psychiatric/Behavior: Normal mental status.  Hematology/Lymph:  Denies bleeding problem or easy bruising/bleeding.  Skin:    Denies rash or suspicious lesions.    Examination:    Vital Signs:    Vitals:    04/26/24 1010   BP: 131/66   Pulse: 60   Weight: 121.2 kg (267 lb 3.2 oz)   Height: 5' 0.28" (1.531 m)       Body mass index is 51.71 kg/m².    Constitution:   Well-developed, well nourished patient in no acute distress.  Neurological:   Alert and oriented x 3 and cooperative to examination.     Psychiatric/Behavior: Normal mental status.  Respiratory:   No shortness of breath.  Nonlabored breathing  Cardiovascular:           Regular rate and rhythm  Eyes:    Extraoccular muscles intact  Skin:    No scars, rash or suspicious lesions.    Physical Exam:     Right Knee:     Grade effusion 1    No erythema or increased heat varus deformity    Patella demonstrated crepitus.    Anteromedial aspect was tender on palpation. Medial aspect was tender on palpation. Medial collateral ligament was tender on palpation.    No lateral joint line tenderness   Active flexion 115 degrees   Active extension 10 degrees   antalgic gait was observed.     X-Ray Knee: A complete knee x-ray with standing for 4 views was performed of the right knee "     IMPRESSIONS RADIOLOGY TEST   Narrowing of the joint space bone on bone in medial and patellofemoral compartments, and osteophytes arising from the knee.    Kellgren Agusto grade 4 changes    Left Knee:     Grade effusion 1    No erythema or increased heat varus deformity    Patella demonstrated crepitus.    Anteromedial aspect was tender on palpation. Medial aspect was tender on palpation. Medial collateral ligament was tender on palpation.    No lateral joint line tenderness   Active flexion 115 degrees   Active extension 5 degrees   antalgic gait was observed.     X-Ray Knee: A complete knee x-ray with standing for 4 views was performed of the left knee     IMPRESSIONS RADIOLOGY TEST   Narrowing of the joint space bone on bone in medial and patellofemoral compartments, and osteophytes arising from the knee.    Kellgren Agusto grade 4 changes        Assessment:     Primary osteoarthritis of both knees  -     X-Ray Knee Complete 4 Or More Views Bilat; Future; Expected date: 04/26/2024  -     Prior authorization Order  -     Ambulatory referral/consult to Physical/Occupational Therapy; Future; Expected date: 05/03/2024    Morbid obesity        Plan:      I have discussed exam and x-ray findings with the patient and her  today.  She has advanced osteoarthritis of both knees.  Definitive treatment consisting of robotic assisted total knee arthroplasty.  Currently her BMI is 51 and she has not a surgical candidate.  She will need a BMI below 40 to become a surgical candidate.  He is under treatment would consist of continued injections such as Synvisc which has helped in the past.  Her last injection was in 2019.  We will get her authorized for right and left knee Synvisc-One injections.  We will also start some physical therapy.  She is encouraged to get with her internist in pursuing medical weight loss.  We will see her back in a couple weeks for the injections        No follow-ups on  file.    DISCLAIMER: This note may have been dictated using voice recognition software and may contain grammatical errors.

## 2024-05-29 ENCOUNTER — OFFICE VISIT (OUTPATIENT)
Dept: ORTHOPEDICS | Facility: CLINIC | Age: 64
End: 2024-05-29
Payer: COMMERCIAL

## 2024-05-29 VITALS
BODY MASS INDEX: 48.1 KG/M2 | HEART RATE: 71 BPM | SYSTOLIC BLOOD PRESSURE: 146 MMHG | DIASTOLIC BLOOD PRESSURE: 78 MMHG | HEIGHT: 60 IN | WEIGHT: 245 LBS

## 2024-05-29 DIAGNOSIS — M17.0 PRIMARY OSTEOARTHRITIS OF BOTH KNEES: Primary | ICD-10-CM

## 2024-05-29 DIAGNOSIS — E66.01 MORBID OBESITY: ICD-10-CM

## 2024-05-29 PROCEDURE — 3288F FALL RISK ASSESSMENT DOCD: CPT | Mod: CPTII,,, | Performed by: PHYSICIAN ASSISTANT

## 2024-05-29 PROCEDURE — 1159F MED LIST DOCD IN RCRD: CPT | Mod: CPTII,,, | Performed by: PHYSICIAN ASSISTANT

## 2024-05-29 PROCEDURE — 1160F RVW MEDS BY RX/DR IN RCRD: CPT | Mod: CPTII,,, | Performed by: PHYSICIAN ASSISTANT

## 2024-05-29 PROCEDURE — 20610 DRAIN/INJ JOINT/BURSA W/O US: CPT | Mod: 50,,, | Performed by: PHYSICIAN ASSISTANT

## 2024-05-29 PROCEDURE — 3078F DIAST BP <80 MM HG: CPT | Mod: CPTII,,, | Performed by: PHYSICIAN ASSISTANT

## 2024-05-29 PROCEDURE — 99499 UNLISTED E&M SERVICE: CPT | Mod: ,,, | Performed by: PHYSICIAN ASSISTANT

## 2024-05-29 PROCEDURE — 1101F PT FALLS ASSESS-DOCD LE1/YR: CPT | Mod: CPTII,,, | Performed by: PHYSICIAN ASSISTANT

## 2024-05-29 PROCEDURE — 3077F SYST BP >= 140 MM HG: CPT | Mod: CPTII,,, | Performed by: PHYSICIAN ASSISTANT

## 2024-05-29 PROCEDURE — 3008F BODY MASS INDEX DOCD: CPT | Mod: CPTII,,, | Performed by: PHYSICIAN ASSISTANT

## 2024-05-29 RX ORDER — LIDOCAINE HYDROCHLORIDE 20 MG/ML
2 INJECTION, SOLUTION INFILTRATION; PERINEURAL
Status: DISCONTINUED | OUTPATIENT
Start: 2024-05-29 | End: 2024-05-29 | Stop reason: HOSPADM

## 2024-05-29 RX ADMIN — LIDOCAINE HYDROCHLORIDE 2 ML: 20 INJECTION, SOLUTION INFILTRATION; PERINEURAL at 03:05

## 2024-05-29 NOTE — LETTER
[facility]  [address] [place] [address]  Phone: [phone]  Fax: [phone]    Name:Fady Peralta  :1960   Date:2024     The above mentioned patient was seen by me on 2024 and needed transportation by Fady GarzaFabian, spouse, and is able to return to work on 2024        If you should have any questions, please contact my office at [phone].    JUAN Bunn MD

## 2024-05-29 NOTE — PROGRESS NOTES
Chief Complaint:   Chief Complaint   Patient presents with    Left Knee - Injections     B/L Synvisc injections.        History of present illness:    64-year-old female presents office today for Synvisc-One injection to the right and left knee today.  No interval change since last exam..  She has a known history of osteoarthritis and wants to see if she has a candidate for repeat Synvisc injections.  She has had these in the past which have helped.  No relief with prior cortisone injections.  Her pain is medial-sided and worse with weight-bearing activity.    Past Medical History:   Diagnosis Date    Anxiety     Depression     Hyperlipidemia     Hypertension     Personal history of colonic polyps        Past Surgical History:   Procedure Laterality Date    ADENOIDECTOMY  1960s    COLONOSCOPY      KNEE ARTHROSCOPY Bilateral     TONSILLECTOMY         Current Outpatient Medications   Medication Sig    ALPRAZolam (XANAX) 0.25 MG tablet Take 0.25 mg by mouth 2 (two) times a day.    celecoxib (CELEBREX) 200 MG capsule Take 1 capsule (200 mg total) by mouth once daily.    CRESTOR 20 mg tablet Take 20 mg by mouth once daily.    EScitalopram oxalate (LEXAPRO) 20 MG tablet Take 20 mg by mouth once daily.    ezetimibe (ZETIA) 10 mg tablet Take 10 mg by mouth once daily.    metoprolol succinate (TOPROL-XL) 50 MG 24 hr tablet Take 1 tablet (50 mg total) by mouth once daily.     No current facility-administered medications for this visit.       Review of patient's allergies indicates:   Allergen Reactions    Iodine Rash    Benzonatate      Other reaction(s): Numbness around mouth.....    Erythromycin      Other reaction(s): Abdominal pain    Penicillins Hives     Other reaction(s): Flushing    Sulfamethoxazole-trimethoprim      Other reaction(s): Abdominal pain    Clindamycin Nausea Only     Other reaction(s): Abdominal pain       No family history on file.    Social History     Socioeconomic History    Marital status:     Tobacco Use    Smoking status: Never    Smokeless tobacco: Never   Substance and Sexual Activity    Alcohol use: Yes    Drug use: Never    Sexual activity: Yes     Partners: Male         Review of Systems:    Constitution:   Denies chills, fever, and sweats.  HENT:   Denies headaches or blurry vision.  Cardiovascular:  Denies chest pain or irregular heart beat.  Respiratory:   Denies cough or shortness of breath.  Gastrointestinal:  Denies abdominal pain, nausea, or vomiting.  Musculoskeletal:   Denies muscle cramps.  Neurological:   Denies dizziness or focal weakness.  Psychiatric/Behavior: Normal mental status.  Hematology/Lymph:  Denies bleeding problem or easy bruising/bleeding.  Skin:    Denies rash or suspicious lesions.    Examination:    Vital Signs:    Vitals:    05/29/24 1527 05/29/24 1528   BP: (!) 146/78    Pulse: 71    Weight: 111.1 kg (245 lb)    Height: 5' (1.524 m)    PainSc:    8       Body mass index is 47.85 kg/m².    Constitution:   Well-developed, well nourished patient in no acute distress.  Neurological:   Alert and oriented x 3 and cooperative to examination.     Psychiatric/Behavior: Normal mental status.  Respiratory:   No shortness of breath.  Nonlabored breathing  Cardiovascular:           Regular rate and rhythm  Eyes:    Extraoccular muscles intact  Skin:    No scars, rash or suspicious lesions.    Physical Exam:     Right Knee:     Grade effusion 1    No erythema or increased heat varus deformity    Patella demonstrated crepitus.    Anteromedial aspect was tender on palpation. Medial aspect was tender on palpation. Medial collateral ligament was tender on palpation.    No lateral joint line tenderness   Active flexion 115 degrees   Active extension 10 degrees   antalgic gait was observed.     X-Ray Knee: A complete knee x-ray with standing for 4 views was reviewed of the right knee     IMPRESSIONS RADIOLOGY TEST   Narrowing of the joint space bone on bone in medial and  patellofemoral compartments, and osteophytes arising from the knee.    Kellgren Agusto grade 4 changes    Left Knee:     Grade effusion 1    No erythema or increased heat varus deformity    Patella demonstrated crepitus.    Anteromedial aspect was tender on palpation. Medial aspect was tender on palpation. Medial collateral ligament was tender on palpation.    No lateral joint line tenderness   Active flexion 115 degrees   Active extension 5 degrees   antalgic gait was observed.     X-Ray Knee: A complete knee x-ray with standing for 4 views was reviewed of the left knee     IMPRESSIONS RADIOLOGY TEST   Narrowing of the joint space bone on bone in medial and patellofemoral compartments, and osteophytes arising from the knee.    Kellgren Agusto grade 4 changes        Assessment:     Primary osteoarthritis of both knees  -     Large Joint Aspiration/Injection: bilateral knee    Morbid obesity        Plan:  Conservative treatment consisting of Synvisc-One injection to both knees today.  Follow up as needed    I have discussed exam and x-ray findings with the patient and her  today.  She has advanced osteoarthritis of both knees.  Definitive treatment consisting of robotic assisted total knee arthroplasty.  Currently her BMI is 51 and she has not a surgical candidate.  She will need a BMI below 40 to become a surgical candidate.          No follow-ups on file.    DISCLAIMER: This note may have been dictated using voice recognition software and may contain grammatical errors.

## 2024-05-29 NOTE — PROCEDURES
Large Joint Aspiration/Injection: bilateral knee    Date/Time: 5/29/2024 3:30 PM    Performed by: Tex Boone PA  Authorized by: Tex Boone PA    Consent Done?:  Yes (Verbal)  Indications:  Pain  Site marked: the procedure site was marked    Timeout: prior to procedure the correct patient, procedure, and site was verified    Prep: patient was prepped and draped in usual sterile fashion      Local anesthesia used?: Yes    Local anesthetic:  Topical anesthetic and lidocaine 2% without epinephrine  Ultrasonic Guidance for needle placement?: No    Approach:  Superior (superolateral)  Location:  Knee  Laterality:  Bilateral  Site:  Bilateral knee  Medications (Right):  48 mg hylan g-f 20 48 mg/6 mL; 2 mL LIDOcaine HCL 20 mg/ml (2%) 20 mg/mL (2 %)  Medications (Left):  48 mg hylan g-f 20 48 mg/6 mL; 2 mL LIDOcaine HCL 20 mg/ml (2%) 20 mg/mL (2 %)  Patient tolerance:  Patient tolerated the procedure well with no immediate complications

## 2024-06-03 ENCOUNTER — OFFICE VISIT (OUTPATIENT)
Dept: INTERNAL MEDICINE | Facility: CLINIC | Age: 64
End: 2024-06-03
Payer: COMMERCIAL

## 2024-06-03 VITALS
DIASTOLIC BLOOD PRESSURE: 72 MMHG | WEIGHT: 264.63 LBS | BODY MASS INDEX: 51.95 KG/M2 | RESPIRATION RATE: 18 BRPM | OXYGEN SATURATION: 94 % | SYSTOLIC BLOOD PRESSURE: 126 MMHG | HEIGHT: 60 IN | HEART RATE: 70 BPM

## 2024-06-03 DIAGNOSIS — U07.1 COVID-19 VIRUS DETECTED: ICD-10-CM

## 2024-06-03 DIAGNOSIS — E78.5 HYPERLIPIDEMIA, UNSPECIFIED HYPERLIPIDEMIA TYPE: ICD-10-CM

## 2024-06-03 DIAGNOSIS — I10 BENIGN ESSENTIAL HYPERTENSION: ICD-10-CM

## 2024-06-03 DIAGNOSIS — J45.909 ASTHMA, UNSPECIFIED ASTHMA SEVERITY, UNSPECIFIED WHETHER COMPLICATED, UNSPECIFIED WHETHER PERSISTENT: ICD-10-CM

## 2024-06-03 DIAGNOSIS — J06.9 UPPER RESPIRATORY TRACT INFECTION, UNSPECIFIED TYPE: Primary | ICD-10-CM

## 2024-06-03 LAB — SARS-COV-2 RNA RESP QL NAA+PROBE: DETECTED

## 2024-06-03 PROCEDURE — 1159F MED LIST DOCD IN RCRD: CPT | Mod: CPTII,,, | Performed by: INTERNAL MEDICINE

## 2024-06-03 PROCEDURE — 3074F SYST BP LT 130 MM HG: CPT | Mod: CPTII,,, | Performed by: INTERNAL MEDICINE

## 2024-06-03 PROCEDURE — 3008F BODY MASS INDEX DOCD: CPT | Mod: CPTII,,, | Performed by: INTERNAL MEDICINE

## 2024-06-03 PROCEDURE — 1101F PT FALLS ASSESS-DOCD LE1/YR: CPT | Mod: CPTII,,, | Performed by: INTERNAL MEDICINE

## 2024-06-03 PROCEDURE — 87635 SARS-COV-2 COVID-19 AMP PRB: CPT | Performed by: INTERNAL MEDICINE

## 2024-06-03 PROCEDURE — 3288F FALL RISK ASSESSMENT DOCD: CPT | Mod: CPTII,,, | Performed by: INTERNAL MEDICINE

## 2024-06-03 PROCEDURE — 99213 OFFICE O/P EST LOW 20 MIN: CPT | Mod: ,,, | Performed by: INTERNAL MEDICINE

## 2024-06-03 PROCEDURE — 3078F DIAST BP <80 MM HG: CPT | Mod: CPTII,,, | Performed by: INTERNAL MEDICINE

## 2024-06-03 RX ORDER — PREDNISONE 10 MG/1
TABLET ORAL
Qty: 18 TABLET | Refills: 0 | Status: SHIPPED | OUTPATIENT
Start: 2024-06-03 | End: 2024-06-12

## 2024-06-03 RX ORDER — LEVOFLOXACIN 500 MG/1
500 TABLET, FILM COATED ORAL DAILY
Qty: 7 TABLET | Refills: 0 | Status: SHIPPED | OUTPATIENT
Start: 2024-06-03

## 2024-06-03 NOTE — PROGRESS NOTES
Subjective:       Patient ID: Fady Peralta is a 64 y.o. female.    Chief Complaint: Sore Throat (Sore throat, sinus issues that started Saturday)       The patient is a 64-year-old woman complaining of cough chills and frontal headache for the past few days.  She did not take her temperature.  Cough has been nonproductive.  History of asthma.  History of numerous antibiotic allergies.  Mild sore throat as well.    She did have both knees injected with Synvisc 4 or 5 days ago.    Sore Throat   Review of Systems   HENT:  Positive for sore throat.       Current Outpatient Medications on File Prior to Visit   Medication Sig Dispense Refill    ALPRAZolam (XANAX) 0.25 MG tablet Take 0.25 mg by mouth 2 (two) times a day.      celecoxib (CELEBREX) 200 MG capsule Take 1 capsule (200 mg total) by mouth once daily. 90 capsule 3    CRESTOR 20 mg tablet Take 20 mg by mouth once daily.      EScitalopram oxalate (LEXAPRO) 20 MG tablet Take 20 mg by mouth once daily.      ezetimibe (ZETIA) 10 mg tablet Take 10 mg by mouth once daily.      metoprolol succinate (TOPROL-XL) 50 MG 24 hr tablet Take 1 tablet (50 mg total) by mouth once daily. 90 tablet 3     No current facility-administered medications on file prior to visit.     Objective:      /72 (BP Location: Right arm, Patient Position: Sitting, BP Method: X-Large (Manual))   Pulse 70   Resp 18   Ht 5' (1.524 m)   Wt 120 kg (264 lb 9.6 oz)   SpO2 (!) 94%   BMI 51.68 kg/m²     Physical Exam  Vitals reviewed.   Constitutional:       Appearance: Normal appearance.   HENT:      Head: Normocephalic.      Nose: Nose normal.      Mouth/Throat:      Pharynx: Oropharynx is clear.   Eyes:      Pupils: Pupils are equal, round, and reactive to light.   Neck:      Thyroid: No thyromegaly.   Cardiovascular:      Rate and Rhythm: Normal rate and regular rhythm.      Pulses: Normal pulses.   Pulmonary:      Breath sounds: Normal breath sounds.      Comments:   It was hard to  listen to her lungs because she coughed so much when she took deep breaths.  Abdominal:      General: Abdomen is flat. Bowel sounds are normal.      Palpations: Abdomen is soft. There is no hepatomegaly, splenomegaly or mass.      Tenderness: There is no abdominal tenderness.   Musculoskeletal:      Cervical back: Neck supple.   Lymphadenopathy:      Cervical: No cervical adenopathy.   Skin:     General: Skin is warm and dry.   Neurological:      Mental Status: She is alert.       Assessment:       1. URI.  Likely sinusitis.  Consider COVID     2. Stable asthma    3. Stable hypertension  Plan:        Empiric Levaquin 500 daily x7 and prednisone taper.  30 mg x 3 days then 20 x 3 then 10 x 3.  Check COVID swab.  Keep follow-up appointment as scheduled.  That is about 2 weeks from now.

## 2024-06-04 ENCOUNTER — TELEPHONE (OUTPATIENT)
Dept: INTERNAL MEDICINE | Facility: CLINIC | Age: 64
End: 2024-06-04
Payer: COMMERCIAL

## 2024-06-04 DIAGNOSIS — U07.1 COVID: Primary | ICD-10-CM

## 2024-06-04 RX ORDER — NIRMATRELVIR AND RITONAVIR 300-100 MG
KIT ORAL
Qty: 30 TABLET | Refills: 0 | Status: SHIPPED | OUTPATIENT
Start: 2024-06-04 | End: 2024-06-09

## 2024-06-17 ENCOUNTER — TELEPHONE (OUTPATIENT)
Dept: INTERNAL MEDICINE | Facility: CLINIC | Age: 64
End: 2024-06-17
Payer: COMMERCIAL

## 2024-06-17 NOTE — TELEPHONE ENCOUNTER
----- Message from Daria Chatman LPN sent at 6/17/2024  1:31 PM CDT -----  Regarding: danny GUSMAN 06/24 @3:20  Fasting labs needed.

## 2024-06-20 ENCOUNTER — LAB VISIT (OUTPATIENT)
Dept: LAB | Facility: HOSPITAL | Age: 64
End: 2024-06-20
Attending: INTERNAL MEDICINE
Payer: COMMERCIAL

## 2024-06-20 DIAGNOSIS — I10 BENIGN ESSENTIAL HYPERTENSION: ICD-10-CM

## 2024-06-20 DIAGNOSIS — J45.909 ASTHMA, UNSPECIFIED ASTHMA SEVERITY, UNSPECIFIED WHETHER COMPLICATED, UNSPECIFIED WHETHER PERSISTENT: ICD-10-CM

## 2024-06-20 DIAGNOSIS — G47.33 OBSTRUCTIVE SLEEP APNEA: ICD-10-CM

## 2024-06-20 DIAGNOSIS — Z00.00 WELLNESS EXAMINATION: ICD-10-CM

## 2024-06-20 DIAGNOSIS — E78.5 HYPERLIPIDEMIA, UNSPECIFIED HYPERLIPIDEMIA TYPE: ICD-10-CM

## 2024-06-20 DIAGNOSIS — E66.01 MORBID OBESITY: ICD-10-CM

## 2024-06-20 LAB
ALBUMIN SERPL-MCNC: 3.7 G/DL (ref 3.4–4.8)
ALBUMIN/GLOB SERPL: 1.4 RATIO (ref 1.1–2)
ALP SERPL-CCNC: 77 UNIT/L (ref 40–150)
ALT SERPL-CCNC: 34 UNIT/L (ref 0–55)
ANION GAP SERPL CALC-SCNC: 6 MEQ/L
AST SERPL-CCNC: 24 UNIT/L (ref 5–34)
BASOPHILS # BLD AUTO: 0.07 X10(3)/MCL
BASOPHILS NFR BLD AUTO: 1.5 %
BILIRUB SERPL-MCNC: 0.5 MG/DL
BUN SERPL-MCNC: 12.5 MG/DL (ref 9.8–20.1)
CALCIUM SERPL-MCNC: 9 MG/DL (ref 8.4–10.2)
CHLORIDE SERPL-SCNC: 102 MMOL/L (ref 98–107)
CHOLEST SERPL-MCNC: 150 MG/DL
CHOLEST/HDLC SERPL: 3 {RATIO} (ref 0–5)
CO2 SERPL-SCNC: 30 MMOL/L (ref 23–31)
CREAT SERPL-MCNC: 0.83 MG/DL (ref 0.55–1.02)
CREAT/UREA NIT SERPL: 15
EOSINOPHIL # BLD AUTO: 0.2 X10(3)/MCL (ref 0–0.9)
EOSINOPHIL NFR BLD AUTO: 4.2 %
ERYTHROCYTE [DISTWIDTH] IN BLOOD BY AUTOMATED COUNT: 12.8 % (ref 11.5–17)
GFR SERPLBLD CREATININE-BSD FMLA CKD-EPI: >60 ML/MIN/1.73/M2
GLOBULIN SER-MCNC: 2.6 GM/DL (ref 2.4–3.5)
GLUCOSE SERPL-MCNC: 109 MG/DL (ref 82–115)
HCT VFR BLD AUTO: 43.5 % (ref 37–47)
HDLC SERPL-MCNC: 43 MG/DL (ref 35–60)
HGB BLD-MCNC: 14 G/DL (ref 12–16)
IMM GRANULOCYTES # BLD AUTO: 0.02 X10(3)/MCL (ref 0–0.04)
IMM GRANULOCYTES NFR BLD AUTO: 0.4 %
LDLC SERPL CALC-MCNC: 92 MG/DL (ref 50–140)
LYMPHOCYTES # BLD AUTO: 1.31 X10(3)/MCL (ref 0.6–4.6)
LYMPHOCYTES NFR BLD AUTO: 27.8 %
MCH RBC QN AUTO: 31.3 PG (ref 27–31)
MCHC RBC AUTO-ENTMCNC: 32.2 G/DL (ref 33–36)
MCV RBC AUTO: 97.1 FL (ref 80–94)
MONOCYTES # BLD AUTO: 0.56 X10(3)/MCL (ref 0.1–1.3)
MONOCYTES NFR BLD AUTO: 11.9 %
NEUTROPHILS # BLD AUTO: 2.56 X10(3)/MCL (ref 2.1–9.2)
NEUTROPHILS NFR BLD AUTO: 54.2 %
NRBC BLD AUTO-RTO: 0 %
PLATELET # BLD AUTO: 150 X10(3)/MCL (ref 130–400)
PLATELETS.RETICULATED NFR BLD AUTO: 12.5 % (ref 0.9–11.2)
PMV BLD AUTO: 13.2 FL (ref 7.4–10.4)
POTASSIUM SERPL-SCNC: 4.6 MMOL/L (ref 3.5–5.1)
PROT SERPL-MCNC: 6.3 GM/DL (ref 5.8–7.6)
RBC # BLD AUTO: 4.48 X10(6)/MCL (ref 4.2–5.4)
SODIUM SERPL-SCNC: 138 MMOL/L (ref 136–145)
TRIGL SERPL-MCNC: 77 MG/DL (ref 37–140)
TSH SERPL-ACNC: 1.56 UIU/ML (ref 0.35–4.94)
VLDLC SERPL CALC-MCNC: 15 MG/DL
WBC # BLD AUTO: 4.72 X10(3)/MCL (ref 4.5–11.5)

## 2024-06-20 PROCEDURE — 80053 COMPREHEN METABOLIC PANEL: CPT

## 2024-06-20 PROCEDURE — 84443 ASSAY THYROID STIM HORMONE: CPT

## 2024-06-20 PROCEDURE — 85025 COMPLETE CBC W/AUTO DIFF WBC: CPT

## 2024-06-20 PROCEDURE — 36415 COLL VENOUS BLD VENIPUNCTURE: CPT

## 2024-06-20 PROCEDURE — 80061 LIPID PANEL: CPT

## 2024-06-24 ENCOUNTER — OFFICE VISIT (OUTPATIENT)
Dept: INTERNAL MEDICINE | Facility: CLINIC | Age: 64
End: 2024-06-24
Payer: COMMERCIAL

## 2024-06-24 VITALS
BODY MASS INDEX: 52.54 KG/M2 | OXYGEN SATURATION: 96 % | WEIGHT: 267.63 LBS | HEIGHT: 60 IN | RESPIRATION RATE: 18 BRPM | HEART RATE: 56 BPM | DIASTOLIC BLOOD PRESSURE: 70 MMHG | SYSTOLIC BLOOD PRESSURE: 130 MMHG

## 2024-06-24 DIAGNOSIS — I10 BENIGN ESSENTIAL HYPERTENSION: ICD-10-CM

## 2024-06-24 DIAGNOSIS — J45.909 ASTHMA, UNSPECIFIED ASTHMA SEVERITY, UNSPECIFIED WHETHER COMPLICATED, UNSPECIFIED WHETHER PERSISTENT: ICD-10-CM

## 2024-06-24 DIAGNOSIS — E78.5 HYPERLIPIDEMIA, UNSPECIFIED HYPERLIPIDEMIA TYPE: ICD-10-CM

## 2024-06-24 DIAGNOSIS — Z00.00 WELLNESS EXAMINATION: Primary | ICD-10-CM

## 2024-06-24 DIAGNOSIS — J06.9 UPPER RESPIRATORY TRACT INFECTION, UNSPECIFIED TYPE: ICD-10-CM

## 2024-06-24 PROCEDURE — 3008F BODY MASS INDEX DOCD: CPT | Mod: CPTII,,, | Performed by: INTERNAL MEDICINE

## 2024-06-24 PROCEDURE — 1159F MED LIST DOCD IN RCRD: CPT | Mod: CPTII,,, | Performed by: INTERNAL MEDICINE

## 2024-06-24 PROCEDURE — 3075F SYST BP GE 130 - 139MM HG: CPT | Mod: CPTII,,, | Performed by: INTERNAL MEDICINE

## 2024-06-24 PROCEDURE — 99397 PER PM REEVAL EST PAT 65+ YR: CPT | Mod: ,,, | Performed by: INTERNAL MEDICINE

## 2024-06-24 PROCEDURE — 1101F PT FALLS ASSESS-DOCD LE1/YR: CPT | Mod: CPTII,,, | Performed by: INTERNAL MEDICINE

## 2024-06-24 PROCEDURE — 3288F FALL RISK ASSESSMENT DOCD: CPT | Mod: CPTII,,, | Performed by: INTERNAL MEDICINE

## 2024-06-24 PROCEDURE — 3078F DIAST BP <80 MM HG: CPT | Mod: CPTII,,, | Performed by: INTERNAL MEDICINE

## 2024-06-24 NOTE — PROGRESS NOTES
Subjective:       Patient ID: Fady Peralta is a 64 y.o. female.    Chief Complaint: Annual Exam (Questions about labwork and weight)      Is a 64-year-old woman in for wellness check.  Since I saw her last she was pretty well gotten over COVID.  That was 3 weeks ago.  Mild residual cough.  No longer wheezing.      She was seen the orthopedic specialist who said she was bone on bone.  Recommended a nutritional supplement called you Rema.  He also recommended handicap license.  We had a discussion about that and I disagree if at all possible we should avoid it.    We also discussed the  [employer] weight loss program.  I highly recommended for her.  She will consider that.    Also she was developed some problems with urinary incontinence.  She was sees a gynecologist in a couple of months since she was discuss it with her.      Review of Systems     Current Outpatient Medications on File Prior to Visit   Medication Sig Dispense Refill    ALPRAZolam (XANAX) 0.25 MG tablet Take 0.25 mg by mouth 2 (two) times a day.      celecoxib (CELEBREX) 200 MG capsule Take 1 capsule (200 mg total) by mouth once daily. 90 capsule 3    CRESTOR 20 mg tablet Take 20 mg by mouth once daily.      EScitalopram oxalate (LEXAPRO) 20 MG tablet Take 20 mg by mouth once daily.      ezetimibe (ZETIA) 10 mg tablet Take 10 mg by mouth once daily.      metoprolol succinate (TOPROL-XL) 50 MG 24 hr tablet Take 1 tablet (50 mg total) by mouth once daily. 90 tablet 3    [DISCONTINUED] levoFLOXacin (LEVAQUIN) 500 MG tablet Take 1 tablet (500 mg total) by mouth once daily. (Patient not taking: Reported on 6/24/2024) 7 tablet 0    [DISCONTINUED] predniSONE (DELTASONE) 10 MG tablet Take 3 tablets (30 mg total) by mouth once daily for 3 days, THEN 2 tablets (20 mg total) once daily for 3 days, THEN 1 tablet (10 mg total) once daily for 3 days. 18 tablet 0     No current facility-administered medications on file prior to visit.     Objective:      BP  130/70 (BP Location: Left arm, Patient Position: Sitting, BP Method: Large (Manual))   Pulse (!) 56   Resp 18   Ht 5' (1.524 m)   Wt 121.4 kg (267 lb 9.6 oz)   SpO2 96%   BMI 52.26 kg/m²     Physical Exam  Vitals reviewed.   Constitutional:       Appearance: Normal appearance.   HENT:      Head: Normocephalic.      Nose: Nose normal.      Mouth/Throat:      Pharynx: Oropharynx is clear.   Eyes:      Pupils: Pupils are equal, round, and reactive to light.   Neck:      Thyroid: No thyromegaly.   Cardiovascular:      Rate and Rhythm: Normal rate and regular rhythm.      Pulses: Normal pulses.   Abdominal:      General: Abdomen is flat. Bowel sounds are normal.      Palpations: Abdomen is soft. There is no hepatomegaly, splenomegaly or mass.      Tenderness: There is no abdominal tenderness.   Musculoskeletal:      Cervical back: Neck supple.   Lymphadenopathy:      Cervical: No cervical adenopathy.   Skin:     General: Skin is warm and dry.   Neurological:      Mental Status: She is alert.       Laboratory studies reviewed  Assessment:       1. Wellness     2. Status post COVID.      3. Asthma.  Currently stable     4. Morbid obesity     5. Hyperlipidemia.  Good control on current meds    6. Hypertension.  Good control on current meds  Plan:     Continue current medications.  Recommend weight loss program.  Recommend that she avoid the handicap license if at all possible.  She will call she feels she can not do without it and I will order it.  Also she will call if she decides to go into the weight loss program.    Tentative follow-up with me yearly with CBC CMP lipid TSH prior

## 2024-06-28 DIAGNOSIS — I10 BENIGN ESSENTIAL HYPERTENSION: ICD-10-CM

## 2024-07-01 RX ORDER — METOPROLOL SUCCINATE 50 MG/1
50 TABLET, EXTENDED RELEASE ORAL DAILY
Qty: 90 TABLET | Refills: 3 | Status: SHIPPED | OUTPATIENT
Start: 2024-07-01

## 2024-09-24 DIAGNOSIS — M17.10 PRIMARY OSTEOARTHRITIS OF KNEE, UNSPECIFIED LATERALITY: ICD-10-CM

## 2024-09-24 DIAGNOSIS — F32.A DEPRESSION, UNSPECIFIED DEPRESSION TYPE: Primary | ICD-10-CM

## 2024-09-24 RX ORDER — CELECOXIB 200 MG/1
200 CAPSULE ORAL DAILY
Qty: 90 CAPSULE | Refills: 3 | Status: SHIPPED | OUTPATIENT
Start: 2024-09-24

## 2024-12-12 ENCOUNTER — OFFICE VISIT (OUTPATIENT)
Dept: URGENT CARE | Facility: CLINIC | Age: 64
End: 2024-12-12
Payer: COMMERCIAL

## 2024-12-12 VITALS
RESPIRATION RATE: 18 BRPM | SYSTOLIC BLOOD PRESSURE: 148 MMHG | HEART RATE: 57 BPM | WEIGHT: 265 LBS | HEIGHT: 60 IN | OXYGEN SATURATION: 96 % | TEMPERATURE: 98 F | BODY MASS INDEX: 52.03 KG/M2 | DIASTOLIC BLOOD PRESSURE: 75 MMHG

## 2024-12-12 DIAGNOSIS — R09.81 NASAL CONGESTION: ICD-10-CM

## 2024-12-12 DIAGNOSIS — U07.1 COVID-19: Primary | ICD-10-CM

## 2024-12-12 LAB
CTP QC/QA: YES
SARS-COV-2 AG RESP QL IA.RAPID: POSITIVE

## 2024-12-12 RX ORDER — OXYBUTYNIN CHLORIDE 5 MG/1
5 TABLET, EXTENDED RELEASE ORAL
COMMUNITY
Start: 2024-12-09

## 2024-12-12 NOTE — LETTER
December 12, 2024      [facility] [place] [address]  [facility] [address]  Phone: [phone]       Patient: Fady Peralta   YOB: 1960  Date of Visit: 12/12/2024    To Whom It May Concern:    Rochelle Peralta  was at [facility] on 12/12/2024. The patient may return to work/school on 12/16/2024 with no restrictions. If you have any questions or concerns, or if I can be of further assistance, please do not hesitate to contact me.    Sincerely,    Krishna Majano MA

## 2024-12-12 NOTE — PROGRESS NOTES
Subjective:      Patient ID: Fady Peralta is a 64 y.o. female.    Vitals:  height is 5' (1.524 m) and weight is 120.2 kg (265 lb). Her temperature is 97.9 °F (36.6 °C). Her blood pressure is 148/75 (abnormal) and her pulse is 57 (abnormal). Her respiration is 18 and oxygen saturation is 96%.     Chief Complaint: COVID-19 Concerns     Patient is a 64 y.o. female who presents to urgent care with complaints of sinus pressure, nasal congestion, runny nose x started today. Exposure to Covid last week.  Patient denies fever, sore throat, nausea, vomiting, diarrhea.      ROS :  Constitutional : _ fever , Body aches, Chills  Eyes : _No redness, drainage or pain  HENT_sore throat, postnasal drainage  Respiratory_no wheezing, no shortness of breath  Cardiovascular_no chest pain  Gastrointestinal_ nausea,No vomiting, No diarrhea, No abdominal pain  Musculoskeletal_no joint pain, no joint swelling  Integumentary_no skin rash     [place]:  64-year-old female present to clinic with concerns of nasal congestion, sinus congestion, sinus pressure and postnasal drip started today.  States possibly related to starting uterus system in the house.  Positive exposure to COVID-19 with spouse last week.  No fever, no sore throat, no difficulty swallowing.  For all other medical conditions follows up with primary MD.  Vaccinated for COVID-19.  Works in the hospital setting as     Objective:     Physical Exam  General : Alert and Oriented, No apparent distress, afebrile, sounds stuffy congested  Neck - supple  HENT : Oropharynx mild redness no swelling, tonsils absent, bilateral TM intact no redness  Respiratory : Bilateral equal breath sounds, nonlabored respirations  Cardiovascular : Rate, rhythm regular, normal volume pulse, no murmur  Gastrointestinal: Full abdomen, soft, nontender to palpate  Integumentary : Warm, Dry and no rash    Assessment:     1. COVID-19    2. Nasal congestion      Plan:   COVID-19 test positive,  condition and course discussed in detail  Adequate hydration and rest. Monitor the symptoms closely  Signs and symptoms that should prompt reevaluation discussed as well  Recommendation is to follow a timeline quarantine measures per CDC and LDH  Tylenol as needed for fever, body aches and headache. Antihistamine of choice for congestion.   Coricidin HBP for cough and cold as needed and as directed.  Trial of vitamin-C and vitamin D3 while in quarantine.  Recommendation is to home quarantine until no fever (100.4 and above) for 24 hours and with improved symptoms  Wear a mask, cover your cough and sneeze. Clean any shared surfaces  Discussed in detail on antiviral medication Paxlovid.  Defers today.  For worsening symptoms patient will call the clinic for the prescription with the understanding has to hold cholesterol medications for 10 days  Call or return to clinic for any questions. ER precautions with any acute change in symptoms. Follow up with primary MD  Work excuse rest of the week    COVID-19    Nasal congestion  -     SARS Coronavirus 2 Antigen, POCT Manual Read

## 2024-12-13 NOTE — PATIENT INSTRUCTIONS
COVID-19 test positive, condition and course discussed in detail  Adequate hydration and rest. Monitor the symptoms closely  Signs and symptoms that should prompt reevaluation discussed as well  Recommendation is to follow a timeline quarantine measures per CDC and [facility]  Tylenol as needed for fever, body aches and headache. Antihistamine of choice for congestion.   Coricidin HBP for cough and cold as needed and as directed.  Trial of vitamin-C and vitamin D3 while in quarantine.  Recommendation is to home quarantine until no fever (100.4 and above) for 24 hours and with improved symptoms  Wear a mask, cover your cough and sneeze. Clean any shared surfaces  Discussed in detail on antiviral medication Paxlovid.  Defers today.  For worsening symptoms patient will call the clinic for the prescription with the understanding has to hold cholesterol medications for 10 days  Call or return to clinic for any questions. ER precautions with any acute change in symptoms. Follow up with primary MD  Work excuse rest of the week

## 2024-12-26 ENCOUNTER — OFFICE VISIT (OUTPATIENT)
Dept: URGENT CARE | Facility: CLINIC | Age: 64
End: 2024-12-26
Payer: COMMERCIAL

## 2024-12-26 VITALS
RESPIRATION RATE: 18 BRPM | OXYGEN SATURATION: 97 % | BODY MASS INDEX: 52.03 KG/M2 | HEIGHT: 60 IN | SYSTOLIC BLOOD PRESSURE: 121 MMHG | WEIGHT: 265 LBS | HEART RATE: 55 BPM | DIASTOLIC BLOOD PRESSURE: 73 MMHG | TEMPERATURE: 98 F

## 2024-12-26 DIAGNOSIS — J01.40 ACUTE NON-RECURRENT PANSINUSITIS: Primary | ICD-10-CM

## 2024-12-26 DIAGNOSIS — R05.9 COUGH, UNSPECIFIED TYPE: ICD-10-CM

## 2024-12-26 PROCEDURE — 99213 OFFICE O/P EST LOW 20 MIN: CPT | Mod: ,,, | Performed by: FAMILY MEDICINE

## 2024-12-26 RX ORDER — LEVOFLOXACIN 750 MG/1
750 TABLET ORAL DAILY
Qty: 7 TABLET | Refills: 0 | Status: SHIPPED | OUTPATIENT
Start: 2024-12-26 | End: 2025-01-02

## 2024-12-26 NOTE — LETTER
December 26, 2024      [facility] [place] [address]  [facility] [address]  Phone: [phone]       Patient: Fady Peralta   YOB: 1960  Date of Visit: 12/26/2024    To Whom It May Concern:    Rochelle Peralta  was at [facility] on 12/26/2024. The patient may return to work/school on 12/27/2024 with no restrictions. If you have any questions or concerns, or if I can be of further assistance, please do not hesitate to contact me.    Sincerely,    Medina Jackson MA

## 2024-12-26 NOTE — PATIENT INSTRUCTIONS
Cool mist vaporizer and cough drop to keep the airways moist.  Claritin or Allegra for congestion  Coricidin HBP for cough and cold as needed and as directed.  Adequate hydration and rest.  Levaquin prescribed today.  Discussed in detail on post viral secondary infection  Monitor the symptoms.  Call or return to clinic for any questions.  With persistent and worsening symptoms may need further evaluation.  Voiced understanding  Follow up with primary MD as needed  Work excuse for today

## 2024-12-26 NOTE — PROGRESS NOTES
Subjective:      Patient ID: Fady Peralta is a 64 y.o. female.    Vitals:  height is 5' (1.524 m) and weight is 120.2 kg (265 lb). Her oral temperature is 97.9 °F (36.6 °C). Her blood pressure is 121/73 and her pulse is 55 (abnormal). Her respiration is 18 and oxygen saturation is 97%.     Chief Complaint: Cough     Patient is a 64 y.o. female who presents to urgent care with complaints of post nasal drip, cough, congestion, sore throat, yellow and blood tinged mucus started yesterday. Patient had COVID x2 weeks ago. Patient denies fever, body aches, HA.      ROS :  Constitutional : _No fatigue, _ Fever  HEENT : _No sore throat, no ear pain, no sinus congestion  Neck : No pain, range of motion present  Respiratory : _Coughing, mucus production  Cardiovascular : _No chest pain, no palpitations  Gastrointestinal : _No vomiting or diarrhea. No abdominal pain  Integumentary : _No skin rash or abnormal lesion  Neurologic_No headache, No dizziness     Solomon:  64-year-old female known for multiple chronic medical conditions follows up with primary MD.  Present to clinic with concerns of worsening congestion, postnasal drip, coughing, thick mucus.  Noticed change in mucus color which appears brown and green.  With increase coughing streaks of blood.  No shortness a breath, no chest pain.  No angelica bleeding.  Positive for COVID 2 weeks ago.  For which patient was seen in the clinic.  Reviewed medication allergies, states Levaquin usually helps her with the symptoms in the past.    Objective:     Physical Exam  General : Alert and Oriented, No apparent distress, afebrile, sounds stuffy congested, coughing sounds wet and bronchial  Neck - supple  HENT : Oropharynx no redness or swelling.  Right ear canal cerumen impaction, left TM intact no redness.   Respiratory : Bilateral equal breath sounds, nonlabored respirations, basal lung fields coarse breath sounds, no bronchial breath sounds, no wheezing  Cardiovascular : Rate,  rhythm regular, normal volume pulse, no murmur  Gastrointestinal: Full abdomen, soft, nontender to palpate  Integumentary : Warm, Dry and no rash    Assessment:     1. Acute non-recurrent pansinusitis    2. Cough, unspecified type      Plan:   Cool mist vaporizer and cough drop to keep the airways moist.  Claritin or Allegra for congestion  Coricidin HBP for cough and cold as needed and as directed.  Adequate hydration and rest.  Levaquin prescribed today.  Discussed in detail on post viral secondary infection  Monitor the symptoms.  Call or return to clinic for any questions.  With persistent and worsening symptoms may need further evaluation.  Voiced understanding  Follow up with primary MD as needed    Acute non-recurrent pansinusitis  -     levoFLOXacin (LEVAQUIN) 750 MG tablet; Take 1 tablet (750 mg total) by mouth once daily. for 7 days  Dispense: 7 tablet; Refill: 0    Cough, unspecified type  -     levoFLOXacin (LEVAQUIN) 750 MG tablet; Take 1 tablet (750 mg total) by mouth once daily. for 7 days  Dispense: 7 tablet; Refill: 0

## 2024-12-31 ENCOUNTER — TELEPHONE (OUTPATIENT)
Dept: INTERNAL MEDICINE | Facility: CLINIC | Age: 64
End: 2024-12-31

## 2024-12-31 ENCOUNTER — OFFICE VISIT (OUTPATIENT)
Dept: INTERNAL MEDICINE | Facility: CLINIC | Age: 64
End: 2024-12-31
Payer: COMMERCIAL

## 2024-12-31 VITALS
SYSTOLIC BLOOD PRESSURE: 115 MMHG | HEIGHT: 60 IN | WEIGHT: 265 LBS | HEART RATE: 62 BPM | OXYGEN SATURATION: 97 % | BODY MASS INDEX: 52.03 KG/M2 | RESPIRATION RATE: 18 BRPM | DIASTOLIC BLOOD PRESSURE: 69 MMHG

## 2024-12-31 DIAGNOSIS — J06.9 UPPER RESPIRATORY TRACT INFECTION, UNSPECIFIED TYPE: ICD-10-CM

## 2024-12-31 DIAGNOSIS — J45.909 ASTHMA, UNSPECIFIED ASTHMA SEVERITY, UNSPECIFIED WHETHER COMPLICATED, UNSPECIFIED WHETHER PERSISTENT: Primary | ICD-10-CM

## 2024-12-31 PROCEDURE — 3288F FALL RISK ASSESSMENT DOCD: CPT | Mod: CPTII,,, | Performed by: INTERNAL MEDICINE

## 2024-12-31 PROCEDURE — 99213 OFFICE O/P EST LOW 20 MIN: CPT | Mod: ,,, | Performed by: INTERNAL MEDICINE

## 2024-12-31 PROCEDURE — 1101F PT FALLS ASSESS-DOCD LE1/YR: CPT | Mod: CPTII,,, | Performed by: INTERNAL MEDICINE

## 2024-12-31 PROCEDURE — 3078F DIAST BP <80 MM HG: CPT | Mod: CPTII,,, | Performed by: INTERNAL MEDICINE

## 2024-12-31 PROCEDURE — 1159F MED LIST DOCD IN RCRD: CPT | Mod: CPTII,,, | Performed by: INTERNAL MEDICINE

## 2024-12-31 PROCEDURE — 3074F SYST BP LT 130 MM HG: CPT | Mod: CPTII,,, | Performed by: INTERNAL MEDICINE

## 2024-12-31 PROCEDURE — 3008F BODY MASS INDEX DOCD: CPT | Mod: CPTII,,, | Performed by: INTERNAL MEDICINE

## 2024-12-31 RX ORDER — CODEINE PHOSPHATE AND GUAIFENESIN 10; 100 MG/5ML; MG/5ML
5 SOLUTION ORAL EVERY 4 HOURS PRN
Qty: 240 ML | Refills: 0 | Status: SHIPPED | OUTPATIENT
Start: 2024-12-31 | End: 2025-01-10

## 2024-12-31 RX ORDER — PREDNISONE 5 MG/1
TABLET ORAL
Qty: 15 TABLET | Refills: 0 | Status: SHIPPED | OUTPATIENT
Start: 2024-12-31 | End: 2025-01-10

## 2024-12-31 NOTE — PROGRESS NOTES
Patient ID: [id number]      Subjective:     Chief Complaint: Cough (Pt had covid two weeks ago, she got better than went to  on the day after yesica and was told she had a sinus infection and a lingering cough from covid)      Fady Peralta is a 64 y.o. female.  The patient is a 64-year-old woman in for evaluation of cough.  She had a COVID infection about 2 weeks ago and that resolved.  She was feeling fine until a few days ago she began having a more severe cough and nasal congestion and yellowish phlegm production.  She has seen in the urgent care clinic and put on Levaquin which she has nearly completed.  She has improved but now she is left with a dry incessant cough.  Is worse after eating or drinking.  Her throat is very dry.  No further fever chills.  No shortness a breath.  No chest pain.    Cough        Review of Systems   Respiratory:  Positive for cough.        Outpatient Medications Marked as Taking for the 12/31/24 encounter (Office Visit) with Joao Ruth MD   Medication Sig Dispense Refill    ALPRAZolam (XANAX) 0.25 MG tablet Take 0.25 mg by mouth 2 (two) times a day.      celecoxib (CELEBREX) 200 MG capsule TAKE 1 CAPSULE (200 MG TOTAL) BY MOUTH ONCE DAILY. 90 capsule 3    CRESTOR 20 mg tablet Take 20 mg by mouth once daily.      EScitalopram oxalate (LEXAPRO) 20 MG tablet Take 20 mg by mouth once daily.      ezetimibe (ZETIA) 10 mg tablet Take 10 mg by mouth once daily.      levoFLOXacin (LEVAQUIN) 750 MG tablet Take 1 tablet (750 mg total) by mouth once daily. for 7 days 7 tablet 0    metoprolol succinate (TOPROL-XL) 50 MG 24 hr tablet TAKE 1 TABLET (50 MG TOTAL) BY MOUTH ONCE DAILY. 90 tablet 3    oxybutynin (DITROPAN-XL) 5 MG TR24 Take 5 mg by mouth.         Objective:     /69 (BP Location: Right arm, Patient Position: Sitting)   Pulse 62   Resp 18   Ht 5' (1.524 m)   Wt 120.2 kg (265 lb)   SpO2 97%   BMI 51.75 kg/m²     Physical Exam  Vitals reviewed.    Constitutional:       Appearance: Normal appearance.   HENT:      Head: Normocephalic.      Nose: Nose normal.      Mouth/Throat:      Pharynx: Oropharynx is clear.   Eyes:      Pupils: Pupils are equal, round, and reactive to light.   Neck:      Thyroid: No thyromegaly.   Cardiovascular:      Rate and Rhythm: Normal rate and regular rhythm.      Pulses: Normal pulses.   Pulmonary:      Breath sounds: Normal breath sounds.   Abdominal:      General: Abdomen is flat. Bowel sounds are normal.      Palpations: Abdomen is soft. There is no hepatomegaly, splenomegaly or mass.      Tenderness: There is no abdominal tenderness.   Musculoskeletal:      Cervical back: Neck supple.   Lymphadenopathy:      Cervical: No cervical adenopathy.   Skin:     General: Skin is warm and dry.   Neurological:      Mental Status: She is alert.       Clinic records reviewed.  Assessment:     1. Upper respiratory tract infection.  Likely secondary infection after COVID.  Resolved but now with persistent dry cough.      2. History of asthma.  Has been stable for quite some time but likely recent flare-up related to 1.    Plan:   Add prednisone 10 mg daily x5 and then 5 mg daily x5 and then DC.  Add Robitussin AC p.r.n. cough.  Follow-up with me as scheduled.  No need for additional antibiotics.  Problem List Items Addressed This Visit          Pulmonary    Asthma - Primary    Relevant Medications    guaiFENesin-codeine 100-10 mg/5 ml (TUSSI-ORGANIDIN NR)  mg/5 mL syrup     Other Visit Diagnoses       Upper respiratory tract infection, unspecified type        Relevant Medications    guaiFENesin-codeine 100-10 mg/5 ml (TUSSI-ORGANIDIN NR)  mg/5 mL syrup             No orders of the defined types were placed in this encounter.       Medication List with Changes/Refills   New Medications    GUAIFENESIN-CODEINE 100-10 MG/5 ML (TUSSI-ORGANIDIN NR)  MG/5 ML SYRUP    Take 5 mLs by mouth every 4 (four) hours as needed for Cough.        Start Date: 12/31/2024End Date: 1/10/2025    PREDNISONE (DELTASONE) 5 MG TABLET    Take 2 tablets (10 mg total) by mouth once daily for 5 days, THEN 1 tablet (5 mg total) once daily for 5 days.       Start Date: 12/31/2024End Date: 1/10/2025   Current Medications    ALPRAZOLAM (XANAX) 0.25 MG TABLET    Take 0.25 mg by mouth 2 (two) times a day.       Start Date: 4/12/2022 End Date: --    CELECOXIB (CELEBREX) 200 MG CAPSULE    TAKE 1 CAPSULE (200 MG TOTAL) BY MOUTH ONCE DAILY.       Start Date: 9/24/2024 End Date: --    CRESTOR 20 MG TABLET    Take 20 mg by mouth once daily.       Start Date: 8/23/2022 End Date: --    ESCITALOPRAM OXALATE (LEXAPRO) 20 MG TABLET    Take 20 mg by mouth once daily.       Start Date: 5/16/2022 End Date: --    EZETIMIBE (ZETIA) 10 MG TABLET    Take 10 mg by mouth once daily.       Start Date: 11/16/2023End Date: --    LEVOFLOXACIN (LEVAQUIN) 750 MG TABLET    Take 1 tablet (750 mg total) by mouth once daily. for 7 days       Start Date: 12/26/2024End Date: 1/2/2025    METOPROLOL SUCCINATE (TOPROL-XL) 50 MG 24 HR TABLET    TAKE 1 TABLET (50 MG TOTAL) BY MOUTH ONCE DAILY.       Start Date: 7/1/2024  End Date: --    OXYBUTYNIN (DITROPAN-XL) 5 MG TR24    Take 5 mg by mouth.       Start Date: 12/9/2024 End Date: --            No follow-ups on file. In addition to their scheduled follow up, the patient has also been instructed to follow up on as needed basis.       Joao Ruth

## 2024-12-31 NOTE — TELEPHONE ENCOUNTER
----- Message from Thierry sent at 12/30/2024  4:41 PM CST -----  .Who Called: Fady Peralta    Caller is requesting assistance/information from provider's office.    Symptoms (please be specific): lingering coughing since covid. States that she coughed so much that she can't eat . Scheduled appt. Still wants pcp to talk to her. 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

## 2025-04-01 DIAGNOSIS — I10 BENIGN ESSENTIAL HYPERTENSION: ICD-10-CM

## 2025-04-01 RX ORDER — METOPROLOL SUCCINATE 50 MG/1
50 TABLET, EXTENDED RELEASE ORAL DAILY
Qty: 90 TABLET | Refills: 3 | Status: SHIPPED | OUTPATIENT
Start: 2025-04-01

## 2025-04-14 ENCOUNTER — OFFICE VISIT (OUTPATIENT)
Dept: NEUROLOGY | Facility: CLINIC | Age: 65
End: 2025-04-14
Payer: COMMERCIAL

## 2025-04-14 VITALS
HEIGHT: 60 IN | WEIGHT: 265 LBS | SYSTOLIC BLOOD PRESSURE: 128 MMHG | DIASTOLIC BLOOD PRESSURE: 82 MMHG | BODY MASS INDEX: 52.03 KG/M2

## 2025-04-14 DIAGNOSIS — G47.19 EXCESSIVE DAYTIME SLEEPINESS: ICD-10-CM

## 2025-04-14 DIAGNOSIS — G47.33 OBSTRUCTIVE SLEEP APNEA: Primary | ICD-10-CM

## 2025-04-14 PROCEDURE — 1101F PT FALLS ASSESS-DOCD LE1/YR: CPT | Mod: CPTII,S$GLB,, | Performed by: NURSE PRACTITIONER

## 2025-04-14 PROCEDURE — 99999 PR PBB SHADOW E&M-EST. PATIENT-LVL III: CPT | Mod: PBBFAC,,, | Performed by: NURSE PRACTITIONER

## 2025-04-14 PROCEDURE — 3288F FALL RISK ASSESSMENT DOCD: CPT | Mod: CPTII,S$GLB,, | Performed by: NURSE PRACTITIONER

## 2025-04-14 PROCEDURE — 3008F BODY MASS INDEX DOCD: CPT | Mod: CPTII,S$GLB,, | Performed by: NURSE PRACTITIONER

## 2025-04-14 PROCEDURE — 1159F MED LIST DOCD IN RCRD: CPT | Mod: CPTII,S$GLB,, | Performed by: NURSE PRACTITIONER

## 2025-04-14 PROCEDURE — 3079F DIAST BP 80-89 MM HG: CPT | Mod: CPTII,S$GLB,, | Performed by: NURSE PRACTITIONER

## 2025-04-14 PROCEDURE — 3074F SYST BP LT 130 MM HG: CPT | Mod: CPTII,S$GLB,, | Performed by: NURSE PRACTITIONER

## 2025-04-14 PROCEDURE — 99213 OFFICE O/P EST LOW 20 MIN: CPT | Mod: S$GLB,,, | Performed by: NURSE PRACTITIONER

## 2025-04-14 NOTE — PROGRESS NOTES
"  Established GLENN Patient   SUBJECTIVE:    Patient ID: Fady Peralta , 65 y.o.    Past Medical History:   Diagnosis Date    Anxiety     Depression     Hyperlipidemia     Hypertension     Personal history of colonic polyps        Past Surgical History:   Procedure Laterality Date    ADENOIDECTOMY  1960s    COLONOSCOPY      KNEE ARTHROSCOPY Bilateral     TONSILLECTOMY         Review of patient's allergies indicates:   Allergen Reactions    Iodine Rash    Benzonatate      Other reaction(s): Numbness around mouth.....    Erythromycin      Other reaction(s): Abdominal pain    Penicillins Hives     Other reaction(s): Flushing    Sulfamethoxazole-trimethoprim      Other reaction(s): Abdominal pain    Clindamycin Nausea Only     Other reaction(s): Abdominal pain       Chief Complaint: GLENN f/u    History of Present Illness:     Here for PAP follow up. Sleeping well at night with PAP; Tolerating PAP therapy "somewhat okay." Feels better the following day after PAP use; denies EDS.   Denies problems with PAP machine. Able to get supplies @ [facility]     Review of Systems - as per HPI, otherwise a balanced 10 systems review is negative.    Current Medications:  Current Outpatient Medications   Medication Instructions    ALPRAZolam (XANAX) 0.25 mg, 2 times daily    celecoxib (CELEBREX) 200 mg, Oral, Daily    CRESTOR 20 mg, Daily    EScitalopram oxalate (LEXAPRO) 20 mg, Daily    ezetimibe (ZETIA) 10 mg, Daily    metoprolol succinate (TOPROL-XL) 50 mg, Oral, Daily    oxybutynin (DITROPAN-XL) 5 mg       OBJECTIVE:    Vitals:  /82 (BP Location: Right forearm, Patient Position: Sitting)   Ht 5' (1.524 m)   Wt 120.2 kg (265 lb)   BMI 51.75 kg/m²      Physical Exam:  Constitutional  she appears well-developed and well-nourished. she is well groomed.    Accompanied by - self  Appearance - well appearing, no apparent distress, unassisted  Heart - RRR auscultated without murmur  Skin- no obvious lesions " noted    Neurologic  Cortical function - The patient is alert, attentive   Speech - clear   Cranial nerves:  CN 3, 4, 6 EOMs - normal. No ptosis or lateral gaze deviation  CN 7 - no face asymmetry   CN 8 - hearing is grossly normal  Gait - unassisted; posture upright. gait is steady with normal steps    Review of Data:      PAP Compliance Report  Last 30 days  Usage > 4 hrs - 100  Usage-100  AHI - 1.6          4/14/2025   EPWORTH SLEEPINESS SCALE   Sitting and reading 2   Watching TV 3   Sitting, inactive in a public place (e.g. a theatre or a meeting) 0   As a passenger in a car for an hour without a break 1   Lying down to rest in the afternoon when circumstances permit 3   Sitting and talking to someone 0   Sitting quietly after a lunch without alcohol 2   In a car, while stopped for a few minutes in traffic 0   Total score 11       ASSESSMENT /PLAN:    Problem List Items Addressed This Visit          Other    Obstructive sleep apnea - Primary    - Continues to benefit from PAP therapy. Encouraged nightly use of PAP.   - F/u in 1 yr        Excessive daytime sleepiness    - Drowsy driving may still occur despite PAP use.        Questions and concerns were sought and answered to the patient's stated verbal satisfaction.    The patient verbalizes understanding and agreement with the above stated treatment plan.   Items discussed include acute and/or chronic neurological, sleep, or other issues and their attendant differential diagnoses.  Potential for additional testing, treatment options, and prognosis also discussed.    ___single dx _*__multiple issues/ diagnoses  ___ low __* mod ___ high complexity of data  __*_low __mod ___ high risks     Medical Decision Making (MDM) used for CPT choice:  _*__low  ___moderate  ____high        LINDA Aggarwal  [facility]  [phone]

## 2025-05-09 ENCOUNTER — HOSPITAL ENCOUNTER (OUTPATIENT)
Dept: RADIOLOGY | Facility: HOSPITAL | Age: 65
Discharge: HOME | End: 2025-05-09
Attending: OBSTETRICS & GYNECOLOGY
Payer: COMMERCIAL

## 2025-05-09 DIAGNOSIS — Z12.31 ENCOUNTER FOR SCREENING MAMMOGRAM FOR BREAST CANCER: ICD-10-CM

## 2025-05-09 PROCEDURE — 77067 SCR MAMMO BI INCL CAD: CPT | Mod: 26,,, | Performed by: RADIOLOGY

## 2025-05-09 PROCEDURE — 77063 BREAST TOMOSYNTHESIS BI: CPT | Mod: 26,,, | Performed by: RADIOLOGY

## 2025-05-09 PROCEDURE — 77063 BREAST TOMOSYNTHESIS BI: CPT | Mod: TC

## 2025-05-22 ENCOUNTER — HOSPITAL ENCOUNTER (OUTPATIENT)
Dept: RADIOLOGY | Facility: CLINIC | Age: 65
Discharge: HOME | End: 2025-05-22
Attending: PHYSICIAN ASSISTANT
Payer: COMMERCIAL

## 2025-05-22 ENCOUNTER — OFFICE VISIT (OUTPATIENT)
Dept: ORTHOPEDICS | Facility: CLINIC | Age: 65
End: 2025-05-22
Payer: COMMERCIAL

## 2025-05-22 VITALS — BODY MASS INDEX: 54.51 KG/M2 | WEIGHT: 277.63 LBS | HEIGHT: 60 IN

## 2025-05-22 DIAGNOSIS — M25.561 PAIN IN BOTH KNEES, UNSPECIFIED CHRONICITY: Primary | ICD-10-CM

## 2025-05-22 DIAGNOSIS — M25.561 PAIN IN BOTH KNEES, UNSPECIFIED CHRONICITY: ICD-10-CM

## 2025-05-22 DIAGNOSIS — M17.0 PRIMARY OSTEOARTHRITIS OF BOTH KNEES: ICD-10-CM

## 2025-05-22 DIAGNOSIS — M25.562 PAIN IN BOTH KNEES, UNSPECIFIED CHRONICITY: Primary | ICD-10-CM

## 2025-05-22 DIAGNOSIS — M25.562 PAIN IN BOTH KNEES, UNSPECIFIED CHRONICITY: ICD-10-CM

## 2025-05-22 PROCEDURE — 3008F BODY MASS INDEX DOCD: CPT | Mod: CPTII,,, | Performed by: PHYSICIAN ASSISTANT

## 2025-05-22 PROCEDURE — 1160F RVW MEDS BY RX/DR IN RCRD: CPT | Mod: CPTII,,, | Performed by: PHYSICIAN ASSISTANT

## 2025-05-22 PROCEDURE — 73564 X-RAY EXAM KNEE 4 OR MORE: CPT | Mod: 50,,, | Performed by: PHYSICIAN ASSISTANT

## 2025-05-22 PROCEDURE — 3288F FALL RISK ASSESSMENT DOCD: CPT | Mod: CPTII,,, | Performed by: PHYSICIAN ASSISTANT

## 2025-05-22 PROCEDURE — 99213 OFFICE O/P EST LOW 20 MIN: CPT | Mod: ,,, | Performed by: PHYSICIAN ASSISTANT

## 2025-05-22 PROCEDURE — 1101F PT FALLS ASSESS-DOCD LE1/YR: CPT | Mod: CPTII,,, | Performed by: PHYSICIAN ASSISTANT

## 2025-05-22 PROCEDURE — 1159F MED LIST DOCD IN RCRD: CPT | Mod: CPTII,,, | Performed by: PHYSICIAN ASSISTANT

## 2025-05-22 RX ORDER — DICLOFENAC SODIUM 50 MG/1
50 TABLET, DELAYED RELEASE ORAL 2 TIMES DAILY
Qty: 60 TABLET | Refills: 1 | Status: SHIPPED | OUTPATIENT
Start: 2025-05-22

## 2025-05-23 NOTE — PROGRESS NOTES
Chief Complaint:   Chief Complaint   Patient presents with    Knee Pain     Theodore knee - had a synvisc one injection on 05/29/2024 with no relief. States pain started to increase 2 wks after. Reports constant pain. Both knees hurt about the same. Has been taking tylenol        History of present illness:    This is a 65 y.o. year old   History of Present Illness    CHIEF COMPLAINT:  - Fady presents for follow-up evaluation of bilateral knee pain.    HPI:  Fady reports daily bilateral knee pain with varying intensity, present even while lying in bed. She experiences pain every day, with fluctuating severity.    She has been on Celebrex since her first knee arthroscopy in 2015. She has received cortisone and viscosupplement injections, with the last injection in May or June of last year, providing only two weeks of relief. She has had knee scopes on both knees for torn meniscus, Baker's cyst removal, and arthritis debridement. The Perry's cyst returned and the arthritis recurred within six months. Recently, she started PT two weeks after receiving knee injections, but it did not provide relief. She has been doing both pool and gym therapy.    She acknowledges needing to lose weight but finds it challenging due to knee pain limiting her ability to exercise. She expresses interest in trying weight loss medications but is unsure about insurance coverage.    She denies any history of diabetes. Cortisone injections in both knees: Last received in May or June of last year, providing only 2 weeks of relief  Viscosupplement injections: Since 2016, last received May 29th of last year, providing only 2 weeks of relief  PT: Including both pool and gym exercises, with no significant benefit reported    IMAGING:  - XR Bilateral Knees: Bone-on-bone appearance in the medial compartment of both knees, Bone-on-bone appearance in the patellofemoral compartment of both knees, Lateral displacement of both kneecaps, with the left more  pronounced than the right    SURGICAL HISTORY:  - Bilateral knee arthroscopy: Performed for torn meniscus, Baker's cyst removal, and arthritis debridement. The Baker's cyst recurred, and arthritis returned within 6 months.    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    CRESTOR 20 mg tablet Take 20 mg by mouth once daily.    EScitalopram oxalate (LEXAPRO) 20 MG tablet Take 20 mg by mouth once daily.    ezetimibe (ZETIA) 10 mg tablet Take 10 mg by mouth once daily.    metoprolol succinate (TOPROL-XL) 50 MG 24 hr tablet TAKE 1 TABLET (50 MG TOTAL) BY MOUTH ONCE DAILY.    oxybutynin (DITROPAN-XL) 5 MG TR24 Take 5 mg by mouth.    diclofenac (VOLTAREN) 50 MG EC tablet Take 1 tablet (50 mg total) by mouth 2 (two) times daily. Take with food     No current facility-administered medications for this visit.       Review of Systems:    Constitution:   Denies chills, fever, and sweats.  HENT:   Denies headaches or blurry vision.  Cardiovascular:  Denies chest pain or irregular heart beat.  Respiratory:   Denies cough or shortness of breath.  Gastrointestinal:  Denies abdominal pain, nausea, or vomiting.  Musculoskeletal:   Denies muscle cramps.  Neurological:   Denies dizziness or focal weakness.  Psychiatric/Behavior: Normal mental status.  Hematology/Lymph:  Denies bleeding problem or easy bruising/bleeding.  Skin:    Denies rash or suspicious lesions.    Examination:    Vital Signs:    Vitals:    05/22/25 1539   Weight: 125.9 kg (277 lb 9.6 oz)   Height: 5' (1.524 m)       Body mass index is 54.22 kg/m².    Constitution:   Well-developed, well nourished patient in no acute distress.  Neurological:   Alert  and oriented x 3 and cooperative to examination.     Psychiatric/Behavior: Normal mental status.  Respiratory:   No shortness of breath.  Eyes:    Extraoccular muscles intact  Skin:    No scars, rash or suspicious lesions.    Physical Exam:   Bilateral Knee     Grade effusion 1    No erythema, warmth bruising     active flexion 100   active extension 10    Tender over medial joint line  Tender over MCL   No lateral compartment tenderness   Negative  Julianna test   Negative  lachman test   No instability on varus or valgus stress   No weakness of the  knee was observed.    Imaging: X-rays ordered and images interpreted today personally by me of bilateral knees four views each knee.    Patient has extensive arthritic changes in both knees with bone-on-bone contact of the medial compartment along with sclerosis and hypertrophic osteophyte formation.    The patient has patellofemoral compartments are bilaterally bone-on-bone as well.    Varus aligned legs        Assessment: Pain in both knees, unspecified chronicity  -     X-Ray Knee Complete 4 Or More Views Bilat; Future; Expected date: 05/22/2025    Primary osteoarthritis of both knees  -     Ambulatory Referral/Consult to Physical Therapy; Future; Expected date: 05/29/2025    Other orders  -     diclofenac (VOLTAREN) 50 MG EC tablet; Take 1 tablet (50 mg total) by mouth 2 (two) times daily. Take with food  Dispense: 60 tablet; Refill: 1         Plan:    Assessment & Plan    BILATERAL OSTEOARTHRITIS OF KNEE:  - Discussed changing anti-inflammatory medication from Celebrex to diclofenac. Explained potential side effects and benefits of diclofenac compared to Celebrex.  - Discussed possibility of future Synvisc injection, noting limited benefit due to only 2 weeks of relief from previous injections.  - Perform aquatic therapy to assist with weight loss and knee rehabilitation.  - Avoid gym-based PT.    PAIN IN KNEES:  - Stop Celebrex.  - Started diclofenac, to be taken  twice daily with meals.  - Discontinue diclofenac if stomach upset occurs.    FOLLOW-UP:  - Follow up in 4-6 weeks to assess effectiveness of new treatment plan.                This note was generated with the assistance of ambient listening technology. Verbal consent was obtained by the patient and accompanying visitor(s) for the recording of patient appointment to facilitate this note. I attest to having reviewed and edited the generated note for accuracy, though some syntax or spelling errors may persist. Please contact the author of this note for any clarification.       DISCLAIMER: This note may have been dictated using voice recognition software and may contain grammatical errors.     NOTE: Consult report sent to referring provider via [employer] EMR.

## 2025-05-29 ENCOUNTER — OFFICE VISIT (OUTPATIENT)
Dept: URGENT CARE | Facility: CLINIC | Age: 65
End: 2025-05-29
Payer: COMMERCIAL

## 2025-05-29 VITALS
WEIGHT: 277 LBS | HEIGHT: 60 IN | BODY MASS INDEX: 54.38 KG/M2 | TEMPERATURE: 98 F | RESPIRATION RATE: 18 BRPM | OXYGEN SATURATION: 98 % | DIASTOLIC BLOOD PRESSURE: 71 MMHG | SYSTOLIC BLOOD PRESSURE: 150 MMHG | HEART RATE: 58 BPM

## 2025-05-29 DIAGNOSIS — J00 ACUTE NASOPHARYNGITIS: Primary | ICD-10-CM

## 2025-05-29 DIAGNOSIS — J02.9 SORE THROAT: ICD-10-CM

## 2025-05-29 LAB
CTP QC/QA: YES
CTP QC/QA: YES
MOLECULAR STREP A: NEGATIVE
SARS-COV-2 RDRP RESP QL NAA+PROBE: NEGATIVE

## 2025-05-29 NOTE — PATIENT INSTRUCTIONS
Discussed the physical finding, concerns of allergies or viral etiology.  Possible early testing examination  COVID-19 test negative, strep test negative.  Adequate hydration  Antihistamine of choice like Claritin Zyrtec or Allegra for congestion  Warm saltwater gargles for sore throat.  Tylenol for pain and discomfort.  Considering close contact with coworkers, for persistent and worsening symptoms can return to clinic for repeat swabs as nurse's visit in next 1-2 days  Call or return to clinic for any questions.  Follow up with primary MD

## 2025-06-16 ENCOUNTER — OFFICE VISIT (OUTPATIENT)
Dept: INTERNAL MEDICINE | Facility: CLINIC | Age: 65
End: 2025-06-16
Payer: COMMERCIAL

## 2025-06-16 VITALS
BODY MASS INDEX: 54.65 KG/M2 | OXYGEN SATURATION: 97 % | HEIGHT: 60 IN | DIASTOLIC BLOOD PRESSURE: 82 MMHG | HEART RATE: 58 BPM | SYSTOLIC BLOOD PRESSURE: 138 MMHG | RESPIRATION RATE: 18 BRPM | WEIGHT: 278.38 LBS

## 2025-06-16 DIAGNOSIS — J45.909 ASTHMA, UNSPECIFIED ASTHMA SEVERITY, UNSPECIFIED WHETHER COMPLICATED, UNSPECIFIED WHETHER PERSISTENT: Primary | ICD-10-CM

## 2025-06-16 DIAGNOSIS — J06.9 UPPER RESPIRATORY TRACT INFECTION, UNSPECIFIED TYPE: ICD-10-CM

## 2025-06-16 DIAGNOSIS — I10 BENIGN ESSENTIAL HYPERTENSION: ICD-10-CM

## 2025-06-16 PROCEDURE — 3008F BODY MASS INDEX DOCD: CPT | Mod: CPTII,,, | Performed by: INTERNAL MEDICINE

## 2025-06-16 PROCEDURE — 1126F AMNT PAIN NOTED NONE PRSNT: CPT | Mod: CPTII,,, | Performed by: INTERNAL MEDICINE

## 2025-06-16 PROCEDURE — 3079F DIAST BP 80-89 MM HG: CPT | Mod: CPTII,,, | Performed by: INTERNAL MEDICINE

## 2025-06-16 PROCEDURE — 3075F SYST BP GE 130 - 139MM HG: CPT | Mod: CPTII,,, | Performed by: INTERNAL MEDICINE

## 2025-06-16 PROCEDURE — 99214 OFFICE O/P EST MOD 30 MIN: CPT | Mod: ,,, | Performed by: INTERNAL MEDICINE

## 2025-06-16 PROCEDURE — 1159F MED LIST DOCD IN RCRD: CPT | Mod: CPTII,,, | Performed by: INTERNAL MEDICINE

## 2025-06-16 RX ORDER — LEVOFLOXACIN 500 MG/1
500 TABLET, FILM COATED ORAL DAILY
Qty: 7 TABLET | Refills: 0 | Status: SHIPPED | OUTPATIENT
Start: 2025-06-16 | End: 2025-06-23

## 2025-06-16 NOTE — PROGRESS NOTES
Patient ID: [id number]      Subjective:     Chief Complaint: Cough (Nasal congestion, symptoms present x three weeks. Pt states she ah not taken any otc cold medication)      Fady Peralta is a 65 y.o. female.  That has a 65-year-old woman who complained of an upper respiratory tract infection going back 3 weeks.  She was exposed to coworkers with COVID.  She did a home test that was negative and went to a walk-in clinic and tested negative there to.  Chills once but no documented fever.  Cough is sometimes purulent and sometimes clear.  The biggest problem has been the persistent cough which he has lasted 3 weeks.  Also very scratchy throat.  Reportedly has a negative strep swab also.        Cough        Review of Systems   Respiratory:  Positive for cough.        Outpatient Medications Marked as Taking for the 6/16/25 encounter (Office Visit) with Joao Ruth MD   Medication Sig Dispense Refill    ALPRAZolam (XANAX) 0.25 MG tablet Take 0.25 mg by mouth 2 (two) times a day.      CRESTOR 20 mg tablet Take 20 mg by mouth once daily.      diclofenac (VOLTAREN) 50 MG EC tablet Take 1 tablet (50 mg total) by mouth 2 (two) times daily. Take with food 60 tablet 1    EScitalopram oxalate (LEXAPRO) 20 MG tablet Take 20 mg by mouth once daily.      ezetimibe (ZETIA) 10 mg tablet Take 10 mg by mouth once daily.      metoprolol succinate (TOPROL-XL) 50 MG 24 hr tablet TAKE 1 TABLET (50 MG TOTAL) BY MOUTH ONCE DAILY. 90 tablet 3    oxybutynin (DITROPAN-XL) 5 MG TR24 Take 5 mg by mouth.         Objective:     /82 (BP Location: Right arm, Patient Position: Sitting)   Pulse (!) 58   Resp 18   Ht 5' (1.524 m)   Wt 126.3 kg (278 lb 6.4 oz)   SpO2 97%   BMI 54.37 kg/m²     Physical Exam  Vitals reviewed.   Constitutional:       Appearance: Normal appearance.      Comments: Weight has gone up.   HENT:      Head: Normocephalic.      Nose: Nose normal.      Mouth/Throat:      Pharynx: Oropharynx is clear.    Eyes:      Pupils: Pupils are equal, round, and reactive to light.   Neck:      Thyroid: No thyromegaly.   Cardiovascular:      Rate and Rhythm: Normal rate and regular rhythm.      Pulses: Normal pulses.   Pulmonary:      Breath sounds: Normal breath sounds.   Abdominal:      General: Abdomen is flat. Bowel sounds are normal.      Palpations: Abdomen is soft. There is no hepatomegaly, splenomegaly or mass.      Tenderness: There is no abdominal tenderness.   Musculoskeletal:      Cervical back: Neck supple.   Lymphadenopathy:      Cervical: No cervical adenopathy.   Skin:     General: Skin is warm and dry.   Neurological:      Mental Status: She is alert.         Assessment:     1. Persistent cough.  Likely resolved viral respiratory tract infection with secondary bacterial infection.    2. Hypertension.  Control     3. Asthma.  Stable     4. Morbid obesity.  We did have a discussion regarding the program for weight loss and she was about to inner it.  Ask my recommendations for treatment choice and I did recommend Zetia bound (tirzepatide).    Plan:   Levaquin 500 daily x7.  Other meds stay the same.  Follow-up with me as scheduled in the near future.  Problem List Items Addressed This Visit          Pulmonary    Asthma - Primary       Cardiac/Vascular    Benign essential hypertension     Other Visit Diagnoses         Upper respiratory tract infection, unspecified type                 No orders of the defined types were placed in this encounter.       Medication List with Changes/Refills   New Medications    LEVOFLOXACIN (LEVAQUIN) 500 MG TABLET    Take 1 tablet (500 mg total) by mouth once daily. for 7 days       Start Date: 6/16/2025 End Date: 6/23/2025   Current Medications    ALPRAZOLAM (XANAX) 0.25 MG TABLET    Take 0.25 mg by mouth 2 (two) times a day.       Start Date: 4/12/2022 End Date: --    CRESTOR 20 MG TABLET    Take 20 mg by mouth once daily.       Start Date: 8/23/2022 End Date: --    DICLOFENAC  (VOLTAREN) 50 MG EC TABLET    Take 1 tablet (50 mg total) by mouth 2 (two) times daily. Take with food       Start Date: 5/22/2025 End Date: --    ESCITALOPRAM OXALATE (LEXAPRO) 20 MG TABLET    Take 20 mg by mouth once daily.       Start Date: 5/16/2022 End Date: --    EZETIMIBE (ZETIA) 10 MG TABLET    Take 10 mg by mouth once daily.       Start Date: 11/16/2023End Date: --    METOPROLOL SUCCINATE (TOPROL-XL) 50 MG 24 HR TABLET    TAKE 1 TABLET (50 MG TOTAL) BY MOUTH ONCE DAILY.       Start Date: 4/1/2025  End Date: --    OXYBUTYNIN (DITROPAN-XL) 5 MG TR24    Take 5 mg by mouth.       Start Date: 12/9/2024 End Date: --            No follow-ups on file. In addition to their scheduled follow up, the patient has also been instructed to follow up on as needed basis.       Joao Ruth

## 2025-06-17 ENCOUNTER — LAB VISIT (OUTPATIENT)
Dept: LAB | Facility: HOSPITAL | Age: 65
End: 2025-06-17
Attending: INTERNAL MEDICINE
Payer: COMMERCIAL

## 2025-06-17 DIAGNOSIS — E78.5 HYPERLIPIDEMIA, UNSPECIFIED HYPERLIPIDEMIA TYPE: ICD-10-CM

## 2025-06-17 DIAGNOSIS — I10 BENIGN ESSENTIAL HYPERTENSION: ICD-10-CM

## 2025-06-17 DIAGNOSIS — J45.909 ASTHMA, UNSPECIFIED ASTHMA SEVERITY, UNSPECIFIED WHETHER COMPLICATED, UNSPECIFIED WHETHER PERSISTENT: ICD-10-CM

## 2025-06-17 DIAGNOSIS — Z00.00 WELLNESS EXAMINATION: ICD-10-CM

## 2025-06-17 LAB
ALBUMIN SERPL-MCNC: 3.7 G/DL (ref 3.4–4.8)
ALBUMIN/GLOB SERPL: 1.3 RATIO (ref 1.1–2)
ALP SERPL-CCNC: 72 UNIT/L (ref 40–150)
ALT SERPL-CCNC: 25 UNIT/L (ref 0–55)
ANION GAP SERPL CALC-SCNC: 4 MEQ/L
AST SERPL-CCNC: 21 UNIT/L (ref 11–45)
BASOPHILS # BLD AUTO: 0.08 X10(3)/MCL
BASOPHILS NFR BLD AUTO: 1.5 %
BILIRUB SERPL-MCNC: 0.5 MG/DL
BUN SERPL-MCNC: 18.7 MG/DL (ref 9.8–20.1)
CALCIUM SERPL-MCNC: 8.8 MG/DL (ref 8.4–10.2)
CHLORIDE SERPL-SCNC: 106 MMOL/L (ref 98–107)
CHOLEST SERPL-MCNC: 124 MG/DL
CHOLEST/HDLC SERPL: 3 {RATIO} (ref 0–5)
CO2 SERPL-SCNC: 31 MMOL/L (ref 23–31)
CREAT SERPL-MCNC: 0.93 MG/DL (ref 0.55–1.02)
CREAT/UREA NIT SERPL: 20
EOSINOPHIL # BLD AUTO: 0.33 X10(3)/MCL (ref 0–0.9)
EOSINOPHIL NFR BLD AUTO: 6.3 %
ERYTHROCYTE [DISTWIDTH] IN BLOOD BY AUTOMATED COUNT: 12.5 % (ref 11.5–17)
GFR SERPLBLD CREATININE-BSD FMLA CKD-EPI: >60 ML/MIN/1.73/M2
GLOBULIN SER-MCNC: 2.8 GM/DL (ref 2.4–3.5)
GLUCOSE SERPL-MCNC: 98 MG/DL (ref 82–115)
HCT VFR BLD AUTO: 45.4 % (ref 37–47)
HDLC SERPL-MCNC: 42 MG/DL (ref 35–60)
HGB BLD-MCNC: 14.4 G/DL (ref 12–16)
IMM GRANULOCYTES # BLD AUTO: 0.02 X10(3)/MCL (ref 0–0.04)
IMM GRANULOCYTES NFR BLD AUTO: 0.4 %
LDLC SERPL CALC-MCNC: 63 MG/DL (ref 50–140)
LYMPHOCYTES # BLD AUTO: 1.33 X10(3)/MCL (ref 0.6–4.6)
LYMPHOCYTES NFR BLD AUTO: 25.5 %
MCH RBC QN AUTO: 31.9 PG (ref 27–31)
MCHC RBC AUTO-ENTMCNC: 31.7 G/DL (ref 33–36)
MCV RBC AUTO: 100.7 FL (ref 80–94)
MONOCYTES # BLD AUTO: 0.52 X10(3)/MCL (ref 0.1–1.3)
MONOCYTES NFR BLD AUTO: 10 %
NEUTROPHILS # BLD AUTO: 2.94 X10(3)/MCL (ref 2.1–9.2)
NEUTROPHILS NFR BLD AUTO: 56.3 %
NRBC BLD AUTO-RTO: 0 %
PLATELET # BLD AUTO: 135 X10(3)/MCL (ref 130–400)
PLATELETS.RETICULATED NFR BLD AUTO: 10 % (ref 0.9–11.2)
PMV BLD AUTO: 13.1 FL (ref 7.4–10.4)
POTASSIUM SERPL-SCNC: 4.5 MMOL/L (ref 3.5–5.1)
PROT SERPL-MCNC: 6.5 GM/DL (ref 5.8–7.6)
RBC # BLD AUTO: 4.51 X10(6)/MCL (ref 4.2–5.4)
SODIUM SERPL-SCNC: 141 MMOL/L (ref 136–145)
TRIGL SERPL-MCNC: 95 MG/DL (ref 37–140)
TSH SERPL-ACNC: 2.08 UIU/ML (ref 0.35–4.94)
VLDLC SERPL CALC-MCNC: 19 MG/DL
WBC # BLD AUTO: 5.22 X10(3)/MCL (ref 4.5–11.5)

## 2025-06-17 PROCEDURE — 36415 COLL VENOUS BLD VENIPUNCTURE: CPT

## 2025-06-17 PROCEDURE — 80061 LIPID PANEL: CPT

## 2025-06-17 PROCEDURE — 85025 COMPLETE CBC W/AUTO DIFF WBC: CPT

## 2025-06-17 PROCEDURE — 84443 ASSAY THYROID STIM HORMONE: CPT

## 2025-06-17 PROCEDURE — 80053 COMPREHEN METABOLIC PANEL: CPT

## 2025-06-19 ENCOUNTER — TELEPHONE (OUTPATIENT)
Dept: INTERNAL MEDICINE | Facility: CLINIC | Age: 65
End: 2025-06-19
Payer: COMMERCIAL

## 2025-06-19 NOTE — TELEPHONE ENCOUNTER
----- Message from Nurse Huff sent at 6/19/2025  1:29 PM CDT -----  Regarding: danny Del Rosario 06/26 @3:00  Labs completed.

## 2025-06-20 DIAGNOSIS — M17.0 PRIMARY OSTEOARTHRITIS OF BOTH KNEES: ICD-10-CM

## 2025-06-20 DIAGNOSIS — M25.561 PAIN IN BOTH KNEES, UNSPECIFIED CHRONICITY: Primary | ICD-10-CM

## 2025-06-20 DIAGNOSIS — M25.562 PAIN IN BOTH KNEES, UNSPECIFIED CHRONICITY: Primary | ICD-10-CM

## 2025-06-20 RX ORDER — DICLOFENAC SODIUM 50 MG/1
50 TABLET, DELAYED RELEASE ORAL 2 TIMES DAILY
Qty: 60 TABLET | Refills: 1 | Status: SHIPPED | OUTPATIENT
Start: 2025-06-20

## 2025-06-20 NOTE — TELEPHONE ENCOUNTER
Attempted to call patient to let her know that we will send one more script of her diclofenac. If she is considering another refill she will have to reach out to  her pcp or come back to our office for possible injection

## 2025-06-26 ENCOUNTER — OFFICE VISIT (OUTPATIENT)
Dept: INTERNAL MEDICINE | Facility: CLINIC | Age: 65
End: 2025-06-26
Payer: COMMERCIAL

## 2025-06-26 ENCOUNTER — OFFICE VISIT (OUTPATIENT)
Dept: ORTHOPEDICS | Facility: CLINIC | Age: 65
End: 2025-06-26
Payer: COMMERCIAL

## 2025-06-26 VITALS
WEIGHT: 275 LBS | RESPIRATION RATE: 18 BRPM | HEIGHT: 60 IN | OXYGEN SATURATION: 94 % | BODY MASS INDEX: 53.99 KG/M2 | HEART RATE: 76 BPM | DIASTOLIC BLOOD PRESSURE: 76 MMHG | SYSTOLIC BLOOD PRESSURE: 134 MMHG

## 2025-06-26 VITALS
HEIGHT: 60 IN | BODY MASS INDEX: 53.33 KG/M2 | SYSTOLIC BLOOD PRESSURE: 152 MMHG | WEIGHT: 271.63 LBS | DIASTOLIC BLOOD PRESSURE: 80 MMHG | HEART RATE: 51 BPM

## 2025-06-26 DIAGNOSIS — M81.0 AGE-RELATED OSTEOPOROSIS WITHOUT CURRENT PATHOLOGICAL FRACTURE: ICD-10-CM

## 2025-06-26 DIAGNOSIS — I10 BENIGN ESSENTIAL HYPERTENSION: ICD-10-CM

## 2025-06-26 DIAGNOSIS — Z00.00 WELLNESS EXAMINATION: ICD-10-CM

## 2025-06-26 DIAGNOSIS — M17.0 PRIMARY OSTEOARTHRITIS OF BOTH KNEES: ICD-10-CM

## 2025-06-26 DIAGNOSIS — F32.A DEPRESSION, UNSPECIFIED DEPRESSION TYPE: ICD-10-CM

## 2025-06-26 DIAGNOSIS — M17.0 PRIMARY OSTEOARTHRITIS OF BOTH KNEES: Primary | ICD-10-CM

## 2025-06-26 DIAGNOSIS — E78.5 HYPERLIPIDEMIA, UNSPECIFIED HYPERLIPIDEMIA TYPE: ICD-10-CM

## 2025-06-26 DIAGNOSIS — J45.909 ASTHMA, UNSPECIFIED ASTHMA SEVERITY, UNSPECIFIED WHETHER COMPLICATED, UNSPECIFIED WHETHER PERSISTENT: ICD-10-CM

## 2025-06-26 DIAGNOSIS — J06.9 UPPER RESPIRATORY TRACT INFECTION, UNSPECIFIED TYPE: ICD-10-CM

## 2025-06-26 DIAGNOSIS — M25.561 PAIN IN BOTH KNEES, UNSPECIFIED CHRONICITY: ICD-10-CM

## 2025-06-26 DIAGNOSIS — Z12.11 ENCOUNTER FOR SCREENING COLONOSCOPY: Primary | ICD-10-CM

## 2025-06-26 DIAGNOSIS — M25.562 PAIN IN BOTH KNEES, UNSPECIFIED CHRONICITY: ICD-10-CM

## 2025-06-26 PROCEDURE — 3077F SYST BP >= 140 MM HG: CPT | Mod: CPTII,,, | Performed by: PHYSICIAN ASSISTANT

## 2025-06-26 PROCEDURE — 3288F FALL RISK ASSESSMENT DOCD: CPT | Mod: CPTII,,, | Performed by: PHYSICIAN ASSISTANT

## 2025-06-26 PROCEDURE — 1101F PT FALLS ASSESS-DOCD LE1/YR: CPT | Mod: CPTII,,, | Performed by: PHYSICIAN ASSISTANT

## 2025-06-26 PROCEDURE — 3008F BODY MASS INDEX DOCD: CPT | Mod: CPTII,,, | Performed by: PHYSICIAN ASSISTANT

## 2025-06-26 PROCEDURE — 3079F DIAST BP 80-89 MM HG: CPT | Mod: CPTII,,, | Performed by: PHYSICIAN ASSISTANT

## 2025-06-26 PROCEDURE — 1160F RVW MEDS BY RX/DR IN RCRD: CPT | Mod: CPTII,,, | Performed by: PHYSICIAN ASSISTANT

## 2025-06-26 PROCEDURE — 99213 OFFICE O/P EST LOW 20 MIN: CPT | Mod: ,,, | Performed by: PHYSICIAN ASSISTANT

## 2025-06-26 PROCEDURE — 1159F MED LIST DOCD IN RCRD: CPT | Mod: CPTII,,, | Performed by: PHYSICIAN ASSISTANT

## 2025-06-26 RX ORDER — DICLOFENAC SODIUM 50 MG/1
50 TABLET, DELAYED RELEASE ORAL 2 TIMES DAILY
Qty: 60 TABLET | Refills: 5 | Status: SHIPPED | OUTPATIENT
Start: 2025-06-26

## 2025-06-26 RX ORDER — AMLODIPINE BESYLATE 5 MG/1
5 TABLET ORAL DAILY
Qty: 30 TABLET | Refills: 11 | Status: SHIPPED | OUTPATIENT
Start: 2025-06-26 | End: 2026-06-26

## 2025-06-26 NOTE — PROGRESS NOTES
Patient ID: [id number]      Subjective:     Chief Complaint: Annual Exam      Fady Peralta is a 65 y.o. female.  That has a 65-year-old woman in for wellness and follow-up of recent asthma attack.  Likely asthmatic bronchitis.  She is much improved but still has a cough, especially at night.  Mostly nonproductive.  No fever chills.  Some wheezing.  She is on a beta-blocker and has been on 1 for about 4 years.  Apparently she has been on ACE inhibitor at some point in her cardiologist put her on a beta-blocker in his place in his did seem to work for quite some time.    Requesting handicap license because of mobility issues     She was put on diclofenac for knee arthritis and that has has been very helpful in reducing her pain and keep her mobile.  She has been on Celebrex before.  I did warn her that that medicine is stronger and was more potential side effects but I would refill it but we will need to watch out for GI problems like ulcers, more salt retention and elevated blood pressure and swelling, and kidney injury.  Hydration remains very important.        Review of Systems    Outpatient Medications Marked as Taking for the 6/26/25 encounter (Office Visit) with Joao Ruth MD   Medication Sig Dispense Refill    ALPRAZolam (XANAX) 0.25 MG tablet Take 0.25 mg by mouth 2 (two) times a day.      CRESTOR 20 mg tablet Take 20 mg by mouth once daily.      EScitalopram oxalate (LEXAPRO) 20 MG tablet Take 20 mg by mouth once daily.      ezetimibe (ZETIA) 10 mg tablet Take 10 mg by mouth once daily.      oxybutynin (DITROPAN-XL) 5 MG TR24 Take 5 mg by mouth.      [DISCONTINUED] diclofenac (VOLTAREN) 50 MG EC tablet Take 1 tablet (50 mg total) by mouth 2 (two) times daily. Take with food 60 tablet 1    [DISCONTINUED] metoprolol succinate (TOPROL-XL) 50 MG 24 hr tablet TAKE 1 TABLET (50 MG TOTAL) BY MOUTH ONCE DAILY. 90 tablet 3       Objective:     /76 (BP Location: Left arm, Patient Position: Sitting)    Pulse 76   Resp 18   Ht 5' (1.524 m)   Wt 124.7 kg (275 lb)   SpO2 (!) 94%   BMI 53.71 kg/m²     Physical Exam  Vitals reviewed.   Constitutional:       Appearance: Normal appearance.   HENT:      Head: Normocephalic.      Nose: Nose normal.      Mouth/Throat:      Pharynx: Oropharynx is clear.   Eyes:      Pupils: Pupils are equal, round, and reactive to light.   Neck:      Thyroid: No thyromegaly.   Cardiovascular:      Rate and Rhythm: Normal rate and regular rhythm.      Pulses: Normal pulses.      Heart sounds: Normal heart sounds.   Abdominal:      General: Abdomen is flat. Bowel sounds are normal.      Palpations: Abdomen is soft. There is no hepatomegaly, splenomegaly or mass.      Tenderness: There is no abdominal tenderness.   Musculoskeletal:      Cervical back: Neck supple.      Comments: Trace bilateral pretibial edema   Lymphadenopathy:      Cervical: No cervical adenopathy.   Skin:     General: Skin is warm and dry.   Neurological:      Mental Status: She is alert.         Assessment:    1. Wellness     2. Hypertension.  Control     3. Asthma.  With exacerbation.  Beta-blocker therapy maybe contributing    4. Hyperlipidemia.  Good control     5. Severe arthritis both knees.  Better with diclofenac but increased risk of side effects     6. Osteoporosis.  By history.  Needs updated bone density study         Plan:   Discontinue Toprol and replace it with amlodipine 5 daily.  Message sent to her cardiologist     Okay to continue diclofenac and DC Celebrex.    Update chest x-ray with two-view film     Follow-up 3 months with CBC CMP lipid TSH prior  Problem List Items Addressed This Visit          Pulmonary    Asthma    Relevant Orders    X-Ray Chest PA And Lateral    CBC Auto Differential    Comprehensive Metabolic Panel    Lipid Panel    TSH       Cardiac/Vascular    Benign essential hypertension    Relevant Orders    CBC Auto Differential    Comprehensive Metabolic Panel    Lipid Panel    TSH     Hyperlipidemia    Relevant Orders    CBC Auto Differential    Comprehensive Metabolic Panel    Lipid Panel    TSH       Orthopedic    Osteoarthritis of knee    Relevant Medications    diclofenac (VOLTAREN) 50 MG EC tablet    Other Relevant Orders    CBC Auto Differential    Comprehensive Metabolic Panel    Lipid Panel    TSH     Other Visit Diagnoses         Encounter for screening colonoscopy    -  Primary    Relevant Orders    Ambulatory referral/consult to Gastroenterology      Age-related osteoporosis without current pathological fracture        Relevant Orders    DXA Bone Density Axial Skeleton 1 or more sites      Wellness examination          Upper respiratory tract infection, unspecified type        Relevant Orders    CBC Auto Differential    Comprehensive Metabolic Panel    Lipid Panel    TSH      Depression, unspecified depression type        Relevant Orders    CBC Auto Differential    Comprehensive Metabolic Panel    Lipid Panel    TSH      Pain in both knees, unspecified chronicity        Relevant Medications    diclofenac (VOLTAREN) 50 MG EC tablet    Other Relevant Orders    CBC Auto Differential    Comprehensive Metabolic Panel    Lipid Panel    TSH             Orders Placed This Encounter   Procedures    DXA Bone Density Axial Skeleton 1 or more sites     Standing Status:   Future     Expected Date:   6/26/2025     Expiration Date:   6/26/2028     May the Radiologist modify the order per protocol to meet the clinical needs of the patient?:   Yes     Release to patient:   Immediate    X-Ray Chest PA And Lateral     Standing Status:   Future     Expected Date:   6/26/2025     Expiration Date:   6/26/2026     May the Radiologist modify the order per protocol to meet the clinical needs of the patient?:   Yes     Release to patient:   Immediate    CBC Auto Differential     Standing Status:   Future     Expected Date:   9/26/2025     Expiration Date:   6/26/2026    Comprehensive Metabolic Panel      Standing Status:   Future     Expected Date:   9/26/2025     Expiration Date:   6/26/2026    Lipid Panel     Standing Status:   Future     Expected Date:   9/26/2025     Expiration Date:   6/26/2026    TSH     Standing Status:   Future     Expected Date:   9/26/2025     Expiration Date:   6/26/2026    Ambulatory referral/consult to Gastroenterology     Standing Status:   Future     Expected Date:   7/3/2025     Expiration Date:   7/26/2026     Referral Priority:   Routine     Referral Type:   Consultation     Referral Reason:   Specialty Services Required     Referred to Provider:   Chalo Cortez MD     Requested Specialty:   Gastroenterology     Number of Visits Requested:   1        Medication List with Changes/Refills   New Medications    AMLODIPINE (NORVASC) 5 MG TABLET    Take 1 tablet (5 mg total) by mouth once daily.       Start Date: 6/26/2025 End Date: 6/26/2026   Current Medications    ALPRAZOLAM (XANAX) 0.25 MG TABLET    Take 0.25 mg by mouth 2 (two) times a day.       Start Date: 4/12/2022 End Date: --    CRESTOR 20 MG TABLET    Take 20 mg by mouth once daily.       Start Date: 8/23/2022 End Date: --    ESCITALOPRAM OXALATE (LEXAPRO) 20 MG TABLET    Take 20 mg by mouth once daily.       Start Date: 5/16/2022 End Date: --    EZETIMIBE (ZETIA) 10 MG TABLET    Take 10 mg by mouth once daily.       Start Date: 11/16/2023End Date: --    OXYBUTYNIN (DITROPAN-XL) 5 MG TR24    Take 5 mg by mouth.       Start Date: 12/9/2024 End Date: --   Changed and/or Refilled Medications    Modified Medication Previous Medication    DICLOFENAC (VOLTAREN) 50 MG EC TABLET diclofenac (VOLTAREN) 50 MG EC tablet       Take 1 tablet (50 mg total) by mouth 2 (two) times daily. Take with food    Take 1 tablet (50 mg total) by mouth 2 (two) times daily. Take with food       Start Date: 6/26/2025 End Date: --    Start Date: 6/20/2025 End Date: 6/26/2025   Discontinued Medications    LEVOFLOXACIN (LEVAQUIN) 500 MG TABLET    Take 1  tablet (500 mg total) by mouth once daily. for 7 days       Start Date: 6/16/2025 End Date: 6/26/2025    METOPROLOL SUCCINATE (TOPROL-XL) 50 MG 24 HR TABLET    TAKE 1 TABLET (50 MG TOTAL) BY MOUTH ONCE DAILY.       Start Date: 4/1/2025  End Date: 6/26/2025            Follow up in about 3 months (around 9/26/2025). In addition to their scheduled follow up, the patient has also been instructed to follow up on as needed basis.       Joao Ruth

## 2025-06-27 ENCOUNTER — HOSPITAL ENCOUNTER (OUTPATIENT)
Dept: RADIOLOGY | Facility: HOSPITAL | Age: 65
Discharge: HOME | End: 2025-06-27
Attending: INTERNAL MEDICINE
Payer: COMMERCIAL

## 2025-06-27 DIAGNOSIS — J45.909 ASTHMA, UNSPECIFIED ASTHMA SEVERITY, UNSPECIFIED WHETHER COMPLICATED, UNSPECIFIED WHETHER PERSISTENT: ICD-10-CM

## 2025-06-27 PROCEDURE — 71046 X-RAY EXAM CHEST 2 VIEWS: CPT | Mod: TC

## 2025-07-01 NOTE — PROGRESS NOTES
Chief Complaint:   Chief Complaint   Patient presents with    Follow-up     Theodore knee follow up - no complaints. Reports improvement with diclofenac. States she continues to go to physical therapy        History of present illness:    This is a 65 y.o. year old   History of Present Illness    CHIEF COMPLAINT:  - Bilateral knee pain    HPI:  Fady presents for follow-up regarding knee pain. She reports significant improvement in her condition, stating her pain is almost nonexistent. She attributes this improvement to a change in medication, noting it took only two days for the new medicine to start working effectively. Her left knee was probably worse than the right, though both were problematic. When present, pain is described as deep in the knee.    She is currently undergoing aquatic therapy and some gym-based therapy, with the therapist working on her legs while she is on the table.    Recent labs was reported as good. Aquatic therapy: Ongoing, providing relief  Gym-based therapy: Ongoing, including work on the patient's legs on the table, beneficial          Current Outpatient Medications   Medication Sig    ALPRAZolam (XANAX) 0.25 MG tablet Take 0.25 mg by mouth 2 (two) times a day.    CRESTOR 20 mg tablet Take 20 mg by mouth once daily.    EScitalopram oxalate (LEXAPRO) 20 MG tablet Take 20 mg by mouth once daily.    ezetimibe (ZETIA) 10 mg tablet Take 10 mg by mouth once daily.    oxybutynin (DITROPAN-XL) 5 MG TR24 Take 5 mg by mouth.    amLODIPine (NORVASC) 5 MG tablet Take 1 tablet (5 mg total) by mouth once daily.    diclofenac (VOLTAREN) 50 MG EC tablet Take 1 tablet (50 mg total) by mouth 2 (two) times daily. Take with food     No current facility-administered medications for this visit.       Review of Systems:    Constitution:   Denies chills, fever, and sweats.  HENT:   Denies headaches or blurry vision.  Cardiovascular:  Denies chest pain or irregular heart beat.  Respiratory:   Denies cough or  shortness of breath.  Gastrointestinal:  Denies abdominal pain, nausea, or vomiting.  Musculoskeletal:   Denies muscle cramps.  Neurological:   Denies dizziness or focal weakness.  Psychiatric/Behavior: Normal mental status.  Hematology/Lymph:  Denies bleeding problem or easy bruising/bleeding.  Skin:    Denies rash or suspicious lesions.    Examination:    Vital Signs:    Vitals:    06/26/25 1339   BP: (!) 152/80   Pulse: (!) 51   Weight: 123.2 kg (271 lb 9.6 oz)   Height: 5' (1.524 m)       Body mass index is 53.04 kg/m².    Constitution:   Well-developed, well nourished patient in no acute distress.  Neurological:   Alert and oriented x 3 and cooperative to examination.     Psychiatric/Behavior: Normal mental status.  Respiratory:   No shortness of breath.  Eyes:    Extraoccular muscles intact  Skin:    No scars, rash or suspicious lesions.    Physical Exam:   Bilateral Knee     Grade effusion 1    No erythema, warmth bruising     active flexion 100   active extension 10  Decreased tenderness over medial joint line     No lateral compartment tenderness   Negative  Julianna test   Negative  lachman test   No instability on varus or valgus stress   No weakness of the  knee was observed.           Assessment: There are no diagnoses linked to this encounter.     Plan:    Assessment & Plan    KNEE PAIN (LEFT AND RIGHT):  - Discussed potential future gel shots or Synvisc-One injection if pain worsens or current treatment becomes ineffective.  - Fady to call and request authorization for these injections if needed.  - Continue current therapy regimen.  - Discontinue therapy when maximum benefit achieved.  - Contact the office if pain increases or current medication becomes less effective, to discuss gel shots or Ruscrom.    FOLLOW-UP:  - Follow up with primary care physician for long-term pain medication management.                This note was generated with the assistance of ambient listening technology. Verbal  consent was obtained by the patient and accompanying visitor(s) for the recording of patient appointment to facilitate this note. I attest to having reviewed and edited the generated note for accuracy, though some syntax or spelling errors may persist. Please contact the author of this note for any clarification.       DISCLAIMER: This note may have been dictated using voice recognition software and may contain grammatical errors.     NOTE: Consult report sent to referring provider via [facility] [employer].

## 2025-07-07 ENCOUNTER — TELEPHONE (OUTPATIENT)
Dept: INTERNAL MEDICINE | Facility: CLINIC | Age: 65
End: 2025-07-07
Payer: COMMERCIAL

## 2025-07-07 NOTE — TELEPHONE ENCOUNTER
----- Message from Jessica sent at 7/7/2025  1:28 PM CDT -----  Regarding: referral update  Good Afternoon,     This is Jessica with [facility](referral scheduling).  I contacted [facility]  to follow up on referral.  [facility] stated they do not have referral and to fax to [phone] . Your assistance is greatly appreciated.  Thanks.

## 2025-07-08 ENCOUNTER — TELEPHONE (OUTPATIENT)
Dept: INTERNAL MEDICINE | Facility: CLINIC | Age: 65
End: 2025-07-08
Payer: COMMERCIAL

## 2025-07-08 NOTE — TELEPHONE ENCOUNTER
Copied from CRM #[id number]. Topic: General Inquiry - Return Call  >> Jul 8, 2025 10:04 AM Mirna wrote:  Who Called: Fadybobby Peralta    Patient is returning phone call    Who Left Message for Patient:sudha  Does the patient know what this is regarding?:results      Preferred Method of Contact: Phone Call  Patient's Preferred Phone Number on File: [phone]   Best Call Back Number, if different:[phone] option #5  Additional Information: return call, please advise, thanks

## 2025-07-08 NOTE — TELEPHONE ENCOUNTER
----- Message from Joao Ruth MD sent at 7/7/2025  5:39 PM CDT -----  Tell her chest x-ray is clear  ----- Message -----  From: Interface, Rad Results In  Sent: 6/28/2025   6:29 PM CDT  To: Joao Ruth MD

## 2025-08-01 ENCOUNTER — TELEPHONE (OUTPATIENT)
Dept: INTERNAL MEDICINE | Facility: CLINIC | Age: 65
End: 2025-08-01
Payer: COMMERCIAL

## 2025-08-01 NOTE — TELEPHONE ENCOUNTER
Pt stated she is on vacation. She wants to know if she should take the metoprolol that she was currently on until she returns home and she can start taking the valsartan? Please advise

## 2025-08-01 NOTE — TELEPHONE ENCOUNTER
Copied from CRM #[id number]. Topic: Medications - Medication Question  >> Aug 1, 2025  8:27 AM Franco wrote:  Who Called: Fady Peralta    Caller is requesting assistance/information from provider's office.    Symptoms (please be specific): Feet swelling   How long has patient had these symptoms:  since last month  List of preferred pharmacies on file (remove unneeded): [unfilled]  If different, enter pharmacy into here including location and phone number:       Preferred Method of Contact: Phone Call  Patient's Preferred Phone Number on File: [phone]   Best Call Back Number, if different:  Additional Information: PT states that new bp medication has her feet swelling and staying swollen. She would like to speak with nurse about side effects. Please advise. Its messing with her everyday work life.

## 2025-08-01 NOTE — TELEPHONE ENCOUNTER
Per  pt advised to continue amlodipine while on vacation, elevate extremities when she is not walking, moving around. Valsartan will be sent in on Thursday, she will be returning from vacation on Friday

## 2025-08-01 NOTE — TELEPHONE ENCOUNTER
Pt states bilateral foot swelling since starting Amlodipine and it's affecting her everyday life. Please advise

## 2025-08-02 ENCOUNTER — NURSE TRIAGE (OUTPATIENT)
Dept: ADMINISTRATIVE | Facility: CLINIC | Age: 65
End: 2025-08-02
Payer: COMMERCIAL

## 2025-08-02 NOTE — TELEPHONE ENCOUNTER
Reason for Disposition   General information question, no triage required and triager able to answer question    Protocols used: Information Only Call - No Triage-A-  Pt states she is in [place]. States her PCP told her he would start her on valsartan. States she takes amlodipine and the swelling in her legs has worsened. She would like to have the new script (valsartan) called in to a pharmacy near her. Advised pt the Provider on call cannot call in a new medication to a pharmacy in another state.     Offered Triage assessment questions regarding the leg swelling to advise when and where she should see a Healthcare Provider. Pt refused and stated she'll discuss with her PCP on Monday. Advised a message will be sent to the Office staff to contact her when the office opens. Pt verbalized understanding.

## 2025-08-05 DIAGNOSIS — I10 BENIGN ESSENTIAL HYPERTENSION: Primary | ICD-10-CM

## 2025-08-05 RX ORDER — VALSARTAN 160 MG/1
160 TABLET ORAL DAILY
Qty: 30 TABLET | Status: SHIPPED | OUTPATIENT
Start: 2025-08-05 | End: 2026-08-05

## 2025-09-02 ENCOUNTER — OFFICE VISIT (OUTPATIENT)
Dept: INTERNAL MEDICINE | Facility: CLINIC | Age: 65
End: 2025-09-02
Payer: COMMERCIAL

## 2025-09-02 ENCOUNTER — HOSPITAL ENCOUNTER (OUTPATIENT)
Dept: RADIOLOGY | Facility: HOSPITAL | Age: 65
Discharge: HOME | End: 2025-09-02
Attending: INTERNAL MEDICINE

## 2025-09-02 VITALS
BODY MASS INDEX: 54.19 KG/M2 | WEIGHT: 276 LBS | DIASTOLIC BLOOD PRESSURE: 77 MMHG | HEART RATE: 60 BPM | HEIGHT: 60 IN | SYSTOLIC BLOOD PRESSURE: 129 MMHG | RESPIRATION RATE: 18 BRPM | OXYGEN SATURATION: 97 %

## 2025-09-02 DIAGNOSIS — I25.10 ATHEROSCLEROTIC HEART DISEASE OF NATIVE CORONARY ARTERY WITHOUT ANGINA PECTORIS: Primary | ICD-10-CM

## 2025-09-02 DIAGNOSIS — T14.90XA TRAUMA: ICD-10-CM

## 2025-09-02 DIAGNOSIS — T14.90XA TRAUMA: Primary | ICD-10-CM

## 2025-09-02 PROCEDURE — 1159F MED LIST DOCD IN RCRD: CPT | Mod: CPTII,,, | Performed by: INTERNAL MEDICINE

## 2025-09-02 PROCEDURE — 99213 OFFICE O/P EST LOW 20 MIN: CPT | Mod: ,,, | Performed by: INTERNAL MEDICINE

## 2025-09-02 PROCEDURE — 3008F BODY MASS INDEX DOCD: CPT | Mod: CPTII,,, | Performed by: INTERNAL MEDICINE

## 2025-09-02 PROCEDURE — 73080 X-RAY EXAM OF ELBOW: CPT | Mod: TC,LT

## 2025-09-02 PROCEDURE — 1101F PT FALLS ASSESS-DOCD LE1/YR: CPT | Mod: CPTII,,, | Performed by: INTERNAL MEDICINE

## 2025-09-02 PROCEDURE — 3288F FALL RISK ASSESSMENT DOCD: CPT | Mod: CPTII,,, | Performed by: INTERNAL MEDICINE

## 2025-09-02 PROCEDURE — 4010F ACE/ARB THERAPY RXD/TAKEN: CPT | Mod: CPTII,,, | Performed by: INTERNAL MEDICINE

## 2025-09-02 PROCEDURE — 3078F DIAST BP <80 MM HG: CPT | Mod: CPTII,,, | Performed by: INTERNAL MEDICINE

## 2025-09-02 PROCEDURE — 3074F SYST BP LT 130 MM HG: CPT | Mod: CPTII,,, | Performed by: INTERNAL MEDICINE

## 2025-09-02 PROCEDURE — 1126F AMNT PAIN NOTED NONE PRSNT: CPT | Mod: CPTII,,, | Performed by: INTERNAL MEDICINE

## 2025-09-03 ENCOUNTER — TELEPHONE (OUTPATIENT)
Dept: INTERNAL MEDICINE | Facility: CLINIC | Age: 65
End: 2025-09-03

## 2025-09-04 ENCOUNTER — OFFICE VISIT (OUTPATIENT)
Dept: INTERNAL MEDICINE | Facility: CLINIC | Age: 65
End: 2025-09-04

## 2025-09-04 ENCOUNTER — TELEPHONE (OUTPATIENT)
Dept: INTERNAL MEDICINE | Facility: CLINIC | Age: 65
End: 2025-09-04

## 2025-09-04 VITALS
OXYGEN SATURATION: 98 % | HEIGHT: 60 IN | SYSTOLIC BLOOD PRESSURE: 110 MMHG | HEART RATE: 62 BPM | BODY MASS INDEX: 53.79 KG/M2 | DIASTOLIC BLOOD PRESSURE: 70 MMHG | WEIGHT: 274 LBS

## 2025-09-04 DIAGNOSIS — E66.01 MORBID OBESITY: ICD-10-CM

## 2025-09-04 DIAGNOSIS — S50.02XA CONTUSION OF LEFT ELBOW, INITIAL ENCOUNTER: ICD-10-CM

## 2025-09-04 DIAGNOSIS — Z91.81 HISTORY OF FALL: ICD-10-CM

## 2025-09-04 DIAGNOSIS — T14.8XXA HEMATOMA: Primary | ICD-10-CM

## 2025-09-04 DIAGNOSIS — S80.12XA CONTUSION OF LEFT LOWER EXTREMITY, INITIAL ENCOUNTER: ICD-10-CM

## 2025-09-05 ENCOUNTER — HOSPITAL ENCOUNTER (OUTPATIENT)
Dept: RADIOLOGY | Facility: HOSPITAL | Age: 65
Discharge: HOME | End: 2025-09-05
Attending: INTERNAL MEDICINE
Payer: COMMERCIAL

## 2025-09-05 DIAGNOSIS — I25.10 ATHEROSCLEROTIC HEART DISEASE OF NATIVE CORONARY ARTERY WITHOUT ANGINA PECTORIS: ICD-10-CM

## 2025-09-05 PROCEDURE — 75571 CT HRT W/O DYE W/CA TEST: CPT | Mod: TC
